# Patient Record
Sex: MALE | Race: BLACK OR AFRICAN AMERICAN | NOT HISPANIC OR LATINO | Employment: FULL TIME | URBAN - METROPOLITAN AREA
[De-identification: names, ages, dates, MRNs, and addresses within clinical notes are randomized per-mention and may not be internally consistent; named-entity substitution may affect disease eponyms.]

---

## 2022-07-01 ENCOUNTER — HOSPITAL ENCOUNTER (EMERGENCY)
Facility: HOSPITAL | Age: 27
Discharge: HOME | End: 2022-07-01
Attending: EMERGENCY MEDICINE
Payer: COMMERCIAL

## 2022-07-01 VITALS
HEART RATE: 60 BPM | HEIGHT: 66 IN | TEMPERATURE: 98 F | WEIGHT: 155 LBS | SYSTOLIC BLOOD PRESSURE: 112 MMHG | RESPIRATION RATE: 16 BRPM | DIASTOLIC BLOOD PRESSURE: 70 MMHG | BODY MASS INDEX: 24.91 KG/M2 | OXYGEN SATURATION: 100 %

## 2022-07-01 DIAGNOSIS — R10.13 EPIGASTRIC ABDOMINAL PAIN: Primary | ICD-10-CM

## 2022-07-01 LAB
ALBUMIN SERPL-MCNC: 4 GM/DL (ref 3.5–5)
ALBUMIN/GLOB SERPL: 1.3 RATIO (ref 1.1–2)
ALP SERPL-CCNC: 69 UNIT/L (ref 40–150)
ALT SERPL-CCNC: 18 UNIT/L (ref 0–55)
APPEARANCE UR: CLEAR
AST SERPL-CCNC: 21 UNIT/L (ref 5–34)
BACTERIA #/AREA URNS AUTO: NORMAL /HPF
BASOPHILS # BLD AUTO: 0.03 X10(3)/MCL (ref 0–0.2)
BASOPHILS NFR BLD AUTO: 0.7 %
BILIRUB UR QL STRIP.AUTO: NEGATIVE MG/DL
BILIRUBIN DIRECT+TOT PNL SERPL-MCNC: 1.2 MG/DL
BUN SERPL-MCNC: 13.8 MG/DL (ref 8.9–20.6)
CALCIUM SERPL-MCNC: 9.5 MG/DL (ref 8.4–10.2)
CHLORIDE SERPL-SCNC: 105 MMOL/L (ref 98–107)
CO2 SERPL-SCNC: 32 MMOL/L (ref 22–29)
COLOR UR AUTO: YELLOW
CREAT SERPL-MCNC: 0.81 MG/DL (ref 0.73–1.18)
EOSINOPHIL # BLD AUTO: 0.14 X10(3)/MCL (ref 0–0.9)
EOSINOPHIL NFR BLD AUTO: 3.3 %
ERYTHROCYTE [DISTWIDTH] IN BLOOD BY AUTOMATED COUNT: 11.9 % (ref 11.5–17)
GLOBULIN SER-MCNC: 3.2 GM/DL (ref 2.4–3.5)
GLUCOSE SERPL-MCNC: 73 MG/DL (ref 74–100)
GLUCOSE UR QL STRIP.AUTO: NEGATIVE MG/DL
HCT VFR BLD AUTO: 45 % (ref 42–52)
HGB BLD-MCNC: 15 GM/DL (ref 14–18)
IMM GRANULOCYTES # BLD AUTO: 0.01 X10(3)/MCL (ref 0–0.04)
IMM GRANULOCYTES NFR BLD AUTO: 0.2 %
KETONES UR QL STRIP.AUTO: NEGATIVE MG/DL
LEUKOCYTE ESTERASE UR QL STRIP.AUTO: NEGATIVE UNIT/L
LIPASE SERPL-CCNC: 38 U/L
LYMPHOCYTES # BLD AUTO: 1.03 X10(3)/MCL (ref 0.6–4.6)
LYMPHOCYTES NFR BLD AUTO: 24.5 %
MAGNESIUM SERPL-MCNC: 1.9 MG/DL (ref 1.6–2.6)
MCH RBC QN AUTO: 31.1 PG (ref 27–31)
MCHC RBC AUTO-ENTMCNC: 33.3 MG/DL (ref 33–36)
MCV RBC AUTO: 93.4 FL (ref 80–94)
MONOCYTES # BLD AUTO: 0.59 X10(3)/MCL (ref 0.1–1.3)
MONOCYTES NFR BLD AUTO: 14 %
NEUTROPHILS # BLD AUTO: 2.4 X10(3)/MCL (ref 2.1–9.2)
NEUTROPHILS NFR BLD AUTO: 57.3 %
NITRITE UR QL STRIP.AUTO: NEGATIVE
NRBC BLD AUTO-RTO: 0 %
PH UR STRIP.AUTO: 7 [PH]
PLATELET # BLD AUTO: 140 X10(3)/MCL (ref 130–400)
PMV BLD AUTO: 12.8 FL (ref 7.4–10.4)
POTASSIUM SERPL-SCNC: 4 MMOL/L (ref 3.5–5.1)
PROT SERPL-MCNC: 7.2 GM/DL (ref 6.4–8.3)
PROT UR QL STRIP.AUTO: NEGATIVE MG/DL
RBC # BLD AUTO: 4.82 X10(6)/MCL (ref 4.7–6.1)
RBC #/AREA URNS AUTO: <5 /HPF
RBC UR QL AUTO: NEGATIVE UNIT/L
SODIUM SERPL-SCNC: 141 MMOL/L (ref 136–145)
SP GR UR STRIP.AUTO: 1.02 (ref 1–1.03)
SQUAMOUS #/AREA URNS AUTO: <5 /HPF
UROBILINOGEN UR STRIP-ACNC: 1 MG/DL
WBC # SPEC AUTO: 4.2 X10(3)/MCL (ref 4.5–11.5)
WBC #/AREA URNS AUTO: <5 /HPF

## 2022-07-01 PROCEDURE — 99285 EMERGENCY DEPT VISIT HI MDM: CPT | Mod: 25

## 2022-07-01 PROCEDURE — 83735 ASSAY OF MAGNESIUM: CPT | Performed by: NURSE PRACTITIONER

## 2022-07-01 PROCEDURE — 25000003 PHARM REV CODE 250: Performed by: NURSE PRACTITIONER

## 2022-07-01 PROCEDURE — 81001 URINALYSIS AUTO W/SCOPE: CPT | Performed by: NURSE PRACTITIONER

## 2022-07-01 PROCEDURE — 36415 COLL VENOUS BLD VENIPUNCTURE: CPT | Performed by: NURSE PRACTITIONER

## 2022-07-01 PROCEDURE — 80053 COMPREHEN METABOLIC PANEL: CPT | Performed by: NURSE PRACTITIONER

## 2022-07-01 PROCEDURE — 85025 COMPLETE CBC W/AUTO DIFF WBC: CPT | Performed by: NURSE PRACTITIONER

## 2022-07-01 PROCEDURE — 83690 ASSAY OF LIPASE: CPT | Performed by: NURSE PRACTITIONER

## 2022-07-01 PROCEDURE — 96360 HYDRATION IV INFUSION INIT: CPT

## 2022-07-01 RX ORDER — PANTOPRAZOLE SODIUM 40 MG/1
40 TABLET, DELAYED RELEASE ORAL DAILY
Qty: 30 TABLET | Refills: 11 | Status: SHIPPED | OUTPATIENT
Start: 2022-07-01 | End: 2023-07-01

## 2022-07-01 RX ORDER — MAG HYDROX/ALUMINUM HYD/SIMETH 200-200-20
30 SUSPENSION, ORAL (FINAL DOSE FORM) ORAL ONCE
Status: COMPLETED | OUTPATIENT
Start: 2022-07-01 | End: 2022-07-01

## 2022-07-01 RX ORDER — SUCRALFATE 1 G/1
1 TABLET ORAL 4 TIMES DAILY
Qty: 28 TABLET | Refills: 0 | Status: SHIPPED | OUTPATIENT
Start: 2022-07-01 | End: 2022-07-08

## 2022-07-01 RX ORDER — DICYCLOMINE HYDROCHLORIDE 20 MG/1
20 TABLET ORAL 3 TIMES DAILY PRN
Qty: 15 TABLET | Refills: 0 | Status: SHIPPED | OUTPATIENT
Start: 2022-07-01 | End: 2022-07-06

## 2022-07-01 RX ORDER — LIDOCAINE HYDROCHLORIDE 20 MG/ML
10 SOLUTION OROPHARYNGEAL ONCE
Status: COMPLETED | OUTPATIENT
Start: 2022-07-01 | End: 2022-07-01

## 2022-07-01 RX ADMIN — LIDOCAINE HYDROCHLORIDE 10 ML: 20 SOLUTION ORAL at 04:07

## 2022-07-01 RX ADMIN — SODIUM CHLORIDE 1000 ML: 9 INJECTION, SOLUTION INTRAVENOUS at 01:07

## 2022-07-01 RX ADMIN — ALUMINUM HYDROXIDE, MAGNESIUM HYDROXIDE, AND SIMETHICONE 30 ML: 200; 200; 20 SUSPENSION ORAL at 04:07

## 2022-07-01 NOTE — FIRST PROVIDER EVALUATION
Medical screening exam completed.  I have conducted a focused provider triage encounter, findings are as follows:    Brief history of present illness:  27 y/o male who presents with epigastric area discomfort with intermittent nausea. He did vomit Tuesday only. States was drinking heavy (bottle wine per day and sometimes hard liquor) but stopped 5 days ago.     There were no vitals filed for this visit.    Pertinent physical exam:  Alert , nonlabored respirations, cooperative    Brief workup plan:  Labs, urine    Preliminary workup initiated; this workup will be continued and followed by the physician or advanced practice provider that is assigned to the patient when roomed.

## 2022-07-01 NOTE — ED TRIAGE NOTES
Pt c/o right sided abd pain since Tuesday w/ n/v. Hx Lupus. Pt reports etoh daily of 1 bottle of wine or liquor. Stopped drinking 5d ago. Denies dysuria. Pt has not had bowel movement in 5 days. SEE DARY

## 2022-07-04 NOTE — ED PROVIDER NOTES
Encounter Date: 7/1/2022       History     Chief Complaint   Patient presents with    Abdominal Pain     Pt c/o right sided abd pain since Tuesday w/ n/v. Hx Lupus. Pt reports etoh daily of 1 bottle of wine or liquor. Stopped drinking 5d ago. Denies dysuria.      26-year-old male presents with complaint of right upper abdominal pain that has been going on since Tuesday with some nausea and vomiting intermittently as well.  Denies any diarrhea denies any constipation states he is having normal bowel movements.  He denies any fever.  He states that he has a history of lupus.  He also states that he usually drinks about a bottle of wine per day sometimes hard liquor as well however he has cut all alcohol at about 5 days ago    The history is provided by the patient. No  was used.   Abdominal Pain  The current episode started several days ago. The problem has not changed since onset.The abdominal pain is located in the RUQ and epigastric region. The abdominal pain does not radiate. The severity of the abdominal pain is 5/10. The abdominal pain is relieved by drinking and eating. The other symptoms of the illness do not include vomiting or diarrhea.   The patient states that she believes she is currently not pregnant.     Review of patient's allergies indicates:  No Known Allergies  Past Medical History:   Diagnosis Date    Systemic lupus erythematosus, organ or system involvement unspecified      History reviewed. No pertinent surgical history.  No family history on file.     Review of Systems   Gastrointestinal: Positive for abdominal pain. Negative for diarrhea and vomiting.   All other systems reviewed and are negative.      Physical Exam     Initial Vitals [07/01/22 1257]   BP Pulse Resp Temp SpO2   108/64 (!) 59 18 98.2 °F (36.8 °C) 99 %      MAP       --         Physical Exam    Nursing note and vitals reviewed.  Constitutional: He appears well-developed and well-nourished.   Eyes:  Conjunctivae are normal.   Cardiovascular: Normal rate, regular rhythm and normal heart sounds.   Pulmonary/Chest: Breath sounds normal. No respiratory distress.   Abdominal: Abdomen is soft. Bowel sounds are normal. There is abdominal tenderness in the right upper quadrant and epigastric area.   Musculoskeletal:         General: Normal range of motion.     Neurological: He is alert and oriented to person, place, and time.   Skin: Skin is warm and dry.   Psychiatric: He has a normal mood and affect.         ED Course   Procedures  Labs Reviewed   CBC WITH DIFFERENTIAL - Abnormal; Notable for the following components:       Result Value    WBC 4.2 (*)     MCH 31.1 (*)     MPV 12.8 (*)     All other components within normal limits   COMPREHENSIVE METABOLIC PANEL - Abnormal; Notable for the following components:    Carbon Dioxide 32 (*)     Glucose Level 73 (*)     All other components within normal limits   LIPASE - Normal   URINALYSIS, REFLEX TO URINE CULTURE - Normal   MAGNESIUM - Normal   URINALYSIS, MICROSCOPIC - Normal          Imaging Results          US Abdomen Limited (Final result)  Result time 07/01/22 16:12:54    Final result by Marija Velasco MD (07/01/22 16:12:54)                 Impression:      No significant abnormality.      Electronically signed by: Marija Velasco  Date:    07/01/2022  Time:    16:12             Narrative:    EXAMINATION:  US ABDOMEN LIMITED    CLINICAL HISTORY:  upper abdominal pain;    TECHNIQUE:  Limited ultrasound of the right upper quadrant of the abdomen was performed.    COMPARISON:  None    FINDINGS:  LIVER: 15.5 cm cranial caudal at the midclavicular line. Normal echotexture. No focal mass appreciable. Portal vein is patent with appropriate directional flow.    PANCREAS: Head of the pancreas appears normal. Tail is obscured by bowel gas.    GALLBLADDER: No shadowing calculi, wall thickening, or pericholecystic fluid.    BILE DUCTS: 3 mm common bile duct.  Distal  common bile duct is obscured by shadowing bowel gas.    INFERIOR VENA CAVA: Normal in appearance.    RIGHT KIDNEY: 11.2 cm. No hydronephrosis.  There is an incidental, simple, anechoic 1.9 cm right renal cyst which requires no imaging follow-up.    OTHER: No ascites.  IVC has a normal appearance.  Aorta is normal in caliber.                                 Medications   sodium chloride 0.9% bolus 1,000 mL (0 mLs Intravenous Stopped 7/1/22 1400)   aluminum-magnesium hydroxide-simethicone 200-200-20 mg/5 mL suspension 30 mL (30 mLs Oral Given 7/1/22 1615)     And   LIDOcaine HCl 2% oral solution 10 mL (10 mLs Oral Given 7/1/22 1615)     Medical Decision Making:   Clinical Tests:   Lab Tests: Ordered and Reviewed  Radiological Study: Ordered and Reviewed  ED Management:  Lab work unremarkable for acute findings.  Ultrasound also no acute findings.  GI cocktail did help the patient's symptoms he states that he is feeling better.  Will treat as gastritis.    Additional MDM:   Differential Diagnosis:   Other: The following diagnoses were also considered and will be evaluated: Pancreatitis, Gastritis.                    Clinical Impression:   Final diagnoses:  [R10.13] Epigastric abdominal pain (Primary)          ED Disposition Condition    Discharge Stable        ED Prescriptions     Medication Sig Dispense Start Date End Date Auth. Provider    pantoprazole (PROTONIX) 40 MG tablet Take 1 tablet (40 mg total) by mouth once daily. 30 tablet 7/1/2022 7/1/2023 JAMAR Angelo    sucralfate (CARAFATE) 1 gram tablet Take 1 tablet (1 g total) by mouth 4 (four) times daily. for 7 days 28 tablet 7/1/2022 7/8/2022 JAMAR Angelo    dicyclomine (BENTYL) 20 mg tablet Take 1 tablet (20 mg total) by mouth 3 (three) times daily as needed (abdominal pain). 15 tablet 7/1/2022 7/6/2022 JAMAR Angelo        Follow-up Information     Follow up With Specialties Details Why Contact Info    Negro Torrez MD Internal  Medicine Call in 1 week As needed, If symptoms worsen [address] [facility]  [facility]  [place] [address]  [phone]      Saeid Silver MD Gastroenterology   [address] [place] [address] [place] [address]  [phone]             Amairani Horner, [facility]  07/04/22 1504

## 2023-03-17 ENCOUNTER — HOSPITAL ENCOUNTER (EMERGENCY)
Facility: HOSPITAL | Age: 28
Discharge: HOME | End: 2023-03-17
Attending: EMERGENCY MEDICINE
Payer: MEDICAID

## 2023-03-17 VITALS
HEIGHT: 66 IN | TEMPERATURE: 98 F | BODY MASS INDEX: 24.11 KG/M2 | WEIGHT: 150 LBS | SYSTOLIC BLOOD PRESSURE: 122 MMHG | HEART RATE: 50 BPM | DIASTOLIC BLOOD PRESSURE: 60 MMHG | RESPIRATION RATE: 18 BRPM | OXYGEN SATURATION: 100 %

## 2023-03-17 DIAGNOSIS — R10.9 ABDOMINAL CRAMPING: Primary | ICD-10-CM

## 2023-03-17 DIAGNOSIS — N50.819 PAIN IN TESTICLE, UNSPECIFIED LATERALITY: ICD-10-CM

## 2023-03-17 DIAGNOSIS — N50.819 TESTICULAR PAIN: ICD-10-CM

## 2023-03-17 LAB
ABS NEUT (OLG): 2.46 X10(3)/MCL (ref 2.1–9.2)
ALBUMIN SERPL-MCNC: 4.4 G/DL (ref 3.5–5)
ALBUMIN/GLOB SERPL: 1.3 RATIO (ref 1.1–2)
ALP SERPL-CCNC: 64 UNIT/L (ref 40–150)
ALT SERPL-CCNC: 21 UNIT/L (ref 0–55)
APPEARANCE UR: CLEAR
AST SERPL-CCNC: 27 UNIT/L (ref 5–34)
BACTERIA #/AREA URNS AUTO: NORMAL /HPF
BILIRUB UR QL STRIP.AUTO: NEGATIVE MG/DL
BILIRUBIN DIRECT+TOT PNL SERPL-MCNC: 2 MG/DL
BUN SERPL-MCNC: 15.1 MG/DL (ref 8.9–20.6)
CALCIUM SERPL-MCNC: 9.7 MG/DL (ref 8.4–10.2)
CHLORIDE SERPL-SCNC: 108 MMOL/L (ref 98–107)
CO2 SERPL-SCNC: 24 MMOL/L (ref 22–29)
COLOR UR AUTO: YELLOW
CREAT SERPL-MCNC: 0.87 MG/DL (ref 0.73–1.18)
EOSINOPHIL NFR BLD MANUAL: 0.2 X10(3)/MCL (ref 0–0.9)
EOSINOPHIL NFR BLD MANUAL: 5 %
ERYTHROCYTE [DISTWIDTH] IN BLOOD BY AUTOMATED COUNT: 11.7 % (ref 11.5–17)
GFR SERPLBLD CREATININE-BSD FMLA CKD-EPI: >60 MLS/MIN/1.73/M2
GLOBULIN SER-MCNC: 3.3 GM/DL (ref 2.4–3.5)
GLUCOSE SERPL-MCNC: 89 MG/DL (ref 74–100)
GLUCOSE UR QL STRIP.AUTO: NEGATIVE MG/DL
HCT VFR BLD AUTO: 46.5 % (ref 42–52)
HGB BLD-MCNC: 15.7 G/DL (ref 14–18)
INSTRUMENT WBC (OLG): 3.9 X10(3)/MCL
KETONES UR QL STRIP.AUTO: ABNORMAL MG/DL
LEUKOCYTE ESTERASE UR QL STRIP.AUTO: NEGATIVE UNIT/L
LYMPHOCYTES NFR BLD MANUAL: 0.86 X10(3)/MCL
LYMPHOCYTES NFR BLD MANUAL: 22 %
MCH RBC QN AUTO: 31 PG
MCHC RBC AUTO-ENTMCNC: 33.8 G/DL (ref 33–36)
MCV RBC AUTO: 91.9 FL (ref 80–94)
MONOCYTES NFR BLD MANUAL: 0.43 X10(3)/MCL (ref 0.1–1.3)
MONOCYTES NFR BLD MANUAL: 11 %
NEUTROPHILS NFR BLD MANUAL: 63 %
NITRITE UR QL STRIP.AUTO: NEGATIVE
NRBC BLD AUTO-RTO: 0 %
PH UR STRIP.AUTO: 6.5 [PH]
PLATELET # BLD AUTO: 142 X10(3)/MCL (ref 130–400)
PLATELET # BLD EST: NORMAL 10*3/UL
PMV BLD AUTO: 12.2 FL (ref 7.4–10.4)
POTASSIUM SERPL-SCNC: 3.8 MMOL/L (ref 3.5–5.1)
PROT SERPL-MCNC: 7.7 GM/DL (ref 6.4–8.3)
PROT UR QL STRIP.AUTO: NEGATIVE MG/DL
RBC # BLD AUTO: 5.06 X10(6)/MCL (ref 4.7–6.1)
RBC #/AREA URNS AUTO: <5 /HPF
RBC MORPH BLD: NORMAL
RBC UR QL AUTO: NEGATIVE UNIT/L
SODIUM SERPL-SCNC: 140 MMOL/L (ref 136–145)
SP GR UR STRIP.AUTO: 1.03 (ref 1–1.03)
SQUAMOUS #/AREA URNS AUTO: <5 /HPF
UROBILINOGEN UR STRIP-ACNC: 1 MG/DL
WBC # SPEC AUTO: 3.9 X10(3)/MCL (ref 4.5–11.5)
WBC #/AREA URNS AUTO: <5 /HPF

## 2023-03-17 PROCEDURE — 81001 URINALYSIS AUTO W/SCOPE: CPT | Performed by: EMERGENCY MEDICINE

## 2023-03-17 PROCEDURE — 85007 BL SMEAR W/DIFF WBC COUNT: CPT | Performed by: EMERGENCY MEDICINE

## 2023-03-17 PROCEDURE — 63600175 PHARM REV CODE 636 W HCPCS: Performed by: EMERGENCY MEDICINE

## 2023-03-17 PROCEDURE — 25500020 PHARM REV CODE 255: Performed by: EMERGENCY MEDICINE

## 2023-03-17 PROCEDURE — 99285 EMERGENCY DEPT VISIT HI MDM: CPT | Mod: 25

## 2023-03-17 PROCEDURE — 96374 THER/PROPH/DIAG INJ IV PUSH: CPT | Mod: 59

## 2023-03-17 PROCEDURE — 96361 HYDRATE IV INFUSION ADD-ON: CPT

## 2023-03-17 PROCEDURE — 80053 COMPREHEN METABOLIC PANEL: CPT | Performed by: EMERGENCY MEDICINE

## 2023-03-17 PROCEDURE — 25000003 PHARM REV CODE 250: Performed by: EMERGENCY MEDICINE

## 2023-03-17 PROCEDURE — 85025 COMPLETE CBC W/AUTO DIFF WBC: CPT | Performed by: EMERGENCY MEDICINE

## 2023-03-17 RX ORDER — INDOMETHACIN 50 MG/1
50 CAPSULE ORAL
Qty: 15 CAPSULE | Refills: 0 | Status: SHIPPED | OUTPATIENT
Start: 2023-03-17

## 2023-03-17 RX ORDER — KETOROLAC TROMETHAMINE 30 MG/ML
30 INJECTION, SOLUTION INTRAMUSCULAR; INTRAVENOUS
Status: COMPLETED | OUTPATIENT
Start: 2023-03-17 | End: 2023-03-17

## 2023-03-17 RX ADMIN — IOPAMIDOL 100 ML: 755 INJECTION, SOLUTION INTRAVENOUS at 10:03

## 2023-03-17 RX ADMIN — KETOROLAC TROMETHAMINE 30 MG: 30 INJECTION, SOLUTION INTRAMUSCULAR; INTRAVENOUS at 08:03

## 2023-03-17 RX ADMIN — SODIUM CHLORIDE 1000 ML: 9 INJECTION, SOLUTION INTRAVENOUS at 08:03

## 2023-03-17 NOTE — ED PROVIDER NOTES
"Encounter Date: 3/17/2023    SCRIBE #1 NOTE: I, Darnell Brunner, am scribing for, and in the presence of,  Dr. Queen. I have scribed the following portions of the note - Other sections scribed: HPI, ROS, Physical Exam, MDM, Attending.     History     Chief Complaint   Patient presents with    Testicle Pain     C/o left testicle pain radiating down left thigh that started yesterday. States it hurts worse while using the bathroom. Denies swelling, trauma & heavy lifting.      26 y/o AAM with history of SLE presents to ED for L testicle pain onset yesterday around 1700.  Pt says it feels like his "veins are swollen" in his testicle.  He also complains of foot numbness, bilateral leg weakness, dysuria, lower abdominal pain and sharp L thigh pain.  He has never had similar symptoms before.  Pt has no history of kidney stone, testicular torsion or infection.  He denies any penile discharge, back pain, HA, neck pain, fever, chills, cough, rhinorrhea, congestion, chest pain or visual changes.    The history is provided by the patient.   Testicle Pain  This is a new problem. The current episode started yesterday. The problem occurs constantly. The problem has not changed since onset.Associated symptoms include abdominal pain. Pertinent negatives include no chest pain.   Review of patient's allergies indicates:  No Known Allergies  Past Medical History:   Diagnosis Date    Systemic lupus erythematosus, organ or system involvement unspecified      History reviewed. No pertinent surgical history.  History reviewed. No pertinent family history.     Review of Systems   Constitutional:  Negative for chills and fever.   HENT:  Negative for congestion and rhinorrhea.    Eyes:  Negative for visual disturbance.   Respiratory:  Negative for cough.    Cardiovascular:  Negative for chest pain.   Gastrointestinal:  Positive for abdominal pain.   Genitourinary:  Positive for dysuria and testicular pain.   Musculoskeletal:  Negative for " back pain and neck pain.   Neurological:  Positive for weakness (legs) and numbness (feet).     Physical Exam     Initial Vitals   BP Pulse Resp Temp SpO2   03/17/23 0805 03/17/23 0805 03/17/23 0805 03/17/23 0808 03/17/23 0805   (!) 117/55 60 18 97.5 °F (36.4 °C) 98 %      MAP       --                Physical Exam    Nursing note and vitals reviewed.  Constitutional:   Appears to be in mild to moderate discomfort    HENT:   Head: Normocephalic and atraumatic.   Neck: Trachea normal.   Cardiovascular:  Normal rate and regular rhythm.           No murmur heard.  Pulmonary/Chest: Breath sounds normal. No respiratory distress.   Abdominal: Abdomen is soft. Bowel sounds are normal. He exhibits no distension. There is abdominal tenderness (moderate lower).   Genitourinary:    Genitourinary Comments: No mass; L testicular tenderness; no testicular edema or swelling; normal riding testicles; no rash     Musculoskeletal:         General: Normal range of motion.      Lumbar back: Normal.     Neurological: He is alert and oriented to person, place, and time. He has normal strength. No cranial nerve deficit.   Skin: Skin is warm and dry. No rash noted.   Psychiatric: He has a normal mood and affect. Judgment normal.       ED Course   Procedures  Labs Reviewed   URINALYSIS, REFLEX TO URINE CULTURE - Abnormal; Notable for the following components:       Result Value    Ketones, UA Trace (*)     All other components within normal limits   COMPREHENSIVE METABOLIC PANEL - Abnormal; Notable for the following components:    Chloride 108 (*)     Bilirubin Total 2.0 (*)     All other components within normal limits   CBC WITH DIFFERENTIAL - Abnormal; Notable for the following components:    WBC 3.9 (*)     MPV 12.2 (*)     All other components within normal limits   URINALYSIS, MICROSCOPIC - Normal   CBC W/ AUTO DIFFERENTIAL    Narrative:     The following orders were created for panel order CBC auto differential.  Procedure                                Abnormality         Status                     ---------                               -----------         ------                     CBC with Differential[[id number]]        Abnormal            Final result               Manual Differential[[id number]]                              Final result                 Please view results for these tests on the individual orders.   MANUAL DIFFERENTIAL          Imaging Results              CT Abdomen Pelvis With Contrast (Final result)  Result time 03/17/23 10:53:23      Final result by Deyanira Curtis MD (03/17/23 10:53:23)                   Impression:      No abnormality seen      Electronically signed by: Deyanira Curtis  Date:    03/17/2023  Time:    10:53               Narrative:    EXAMINATION:  CT ABDOMEN PELVIS WITH CONTRAST    CLINICAL HISTORY:  Abdominal pain, acute, nonlocalized;    TECHNIQUE:  Low dose axial images, sagittal and coronal reformations were obtained from the lung bases to the pubic symphysis following the IV administration of contrast. Automatic exposure control (AEC) is utilized to reduce patient radiation exposure.    COMPARISON:  None.    FINDINGS:  The lung bases are clear.    The liver appears normal.  No liver mass or lesion is seen.  Portal and hepatic veins appear normal.    The gallbladder appears normal.  No obvious gallstones are seen.  No biliary dilatation is seen.  No pericholecystic fluid is seen.    The pancreas appears normal.  No pancreatic mass or lesion is seen.    The spleen shows no acute abnormality.    The adrenal glands appear normal.  No adrenal nodule is seen.    The kidneys appear normal.  There is a simple cyst in the midpole the right kidney.  No hydronephrosis is seen.  No hydroureter is seen.  No nephrolithiasis is seen.  No obvious ureteral stones are seen.    Urinary bladder appears grossly unremarkable.  No inguinal abnormality is seen.  Visualized portion of the testicle show no acute  abnormality.    No colitis is seen.  No diverticulitis is seen.  No obvious colonic mass or lesion is seen.    No free air is seen.  No free fluid is seen.                                       US Scrotum And Testicles (Final result)  Result time 03/17/23 09:58:51      Final result by Maximiliano Walker MD (03/17/23 09:58:51)                   Narrative:    EXAMINATION  US SCROTUM AND TESTICLES    CLINICAL HISTORY  Left  testicular pain, unspecified    TECHNIQUE  Grayscale and Doppler interrogation of the scrotum and testes.    COMPARISON  19 July 2018    FINDINGS  Exam quality: adequate for evaluation    Right Testicle: Surface contour intact and smooth. No evidence of focal mass or infiltrative parenchymal abnormality.  Focal echogenic structure within the testicular lower pole measures up to 2 mm in diameter, unchanged in the interval.  No significant enlargement or heterogeneity of the epididymis.    Left Testicle: Surface contour intact and smooth. No evidence of focal mass or infiltrative parenchymal abnormality.  No significant enlargement or heterogeneity of the epididymis.  Simple appearing 3 mm cystic structure is present at the left epididymal head.    Doppler Interrogation: Spontaneous arterial flow is demonstrated within each testicle, with symmetric velocity and normal low-resistance spectral waveform.  Bilateral venous outflow is maintained.    Other findings: No significant hydrocele or other abnormal fluid appreciated.  No varicocele appreciated bilaterally.      Measurements:    *Right testicle: 4.7 cm x 2.3 cm x 3 cm (17 cc)  *Left testicle: 4.1 cm x 2.1 cm x 2.8 cm (12 cc)    IMPRESSION  1. No evidence of active testicular torsion or other acute process.  2. Similar appearance of echogenic right lower testicular pole structure, with no expansile or infiltrative mass-like abnormality.  3. Small, simple left epididymal head cyst.      Electronically signed by: Maximiliano  Denise  Date:    03/17/2023  Time:    09:58                                     Medications   ketorolac injection 30 mg (30 mg Intravenous Given 3/17/23 0845)   sodium chloride 0.9% bolus 1,000 mL 1,000 mL (1,000 mLs Intravenous New Bag 3/17/23 0845)   iopamidoL (ISOVUE-370) injection 100 mL (100 mLs Intravenous Given 3/17/23 1040)              Scribe Attestation:   Scribe #1: I performed the above scribed service and the documentation accurately describes the services I performed. I attest to the accuracy of the note.    Attending Attestation:           Physician Attestation for Scribe:  Physician Attestation Statement for Scribe #1: I, reviewed documentation, as scribed by Darnell Brunner in my presence, and it is both accurate and complete.         Medical Decision Making  Differential diagnoses include, but are not limited to: testicular torsion, epididymitis, diverticulitis, appendicitis, kidney stone      CBC chemistry UA without abnormality ultrasound of the testicle shows normal testes normal flow no epididymitis no orchitis.  CT done secondary to abdominal pain with normal exam patient will be discharged follow up primary care patient has had other episodes of this in the past    Problems Addressed:  Abdominal cramping: acute illness or injury  Pain in testicle, unspecified laterality: complicated acute illness or injury    Amount and/or Complexity of Data Reviewed  Labs: ordered. Decision-making details documented in ED Course.  Radiology: ordered. Decision-making details documented in ED Course.            ED Course as of 03/17/23 1149   Fri Mar 17, 2023   0853 Left testicle pain abdominal pain seems most likely intra-abdominal pathology but will ultrasound testicles 1st then depending on urinalysis will CT with or without contrast will give fluids and Toradol for pain. [FK]   1027 Ultrasound without torsion or epididymitis effectively rules out testicular abnormalities combined with physical exam [FK]       ED Course User Index  [FK] Noel Queen III, MD                 Clinical Impression:   Final diagnoses:  [N50.819] Testicular pain  [R10.9] Abdominal cramping (Primary)  [N50.819] Pain in testicle, unspecified laterality        ED Disposition Condition    Discharge Stable          ED Prescriptions       Medication Sig Dispense Start Date End Date Auth. Provider    indomethacin (INDOCIN) 50 MG capsule Take 1 capsule (50 mg total) by mouth 3 (three) times daily with meals. 15 capsule 3/17/2023 -- Noel Queen III, MD          Follow-up Information       Follow up With Specialties Details Why Contact Info    Negro Torrez MD Internal Medicine In 1 week  [address] [facility]  [facility]  [place] [id number]  [phone]               Noel Queen III, MD  03/17/23 5658

## 2024-01-20 ENCOUNTER — HOSPITAL ENCOUNTER (EMERGENCY)
Facility: HOSPITAL | Age: 29
Discharge: HOME | End: 2024-01-20
Attending: STUDENT IN AN ORGANIZED HEALTH CARE EDUCATION/TRAINING PROGRAM

## 2024-01-20 VITALS
WEIGHT: 160 LBS | SYSTOLIC BLOOD PRESSURE: 117 MMHG | RESPIRATION RATE: 16 BRPM | TEMPERATURE: 98 F | OXYGEN SATURATION: 98 % | DIASTOLIC BLOOD PRESSURE: 66 MMHG | HEART RATE: 89 BPM | BODY MASS INDEX: 25.82 KG/M2

## 2024-01-20 DIAGNOSIS — R07.89 CHEST WALL PAIN: Primary | ICD-10-CM

## 2024-01-20 LAB
ALBUMIN SERPL-MCNC: 3.8 G/DL (ref 3.5–5)
ALBUMIN/GLOB SERPL: 1.2 RATIO (ref 1.1–2)
ALP SERPL-CCNC: 71 UNIT/L (ref 40–150)
ALT SERPL-CCNC: 17 UNIT/L (ref 0–55)
AST SERPL-CCNC: 22 UNIT/L (ref 5–34)
BASOPHILS # BLD AUTO: 0.03 X10(3)/MCL
BASOPHILS NFR BLD AUTO: 0.9 %
BILIRUB SERPL-MCNC: 0.9 MG/DL
BNP BLD-MCNC: <10 PG/ML
BUN SERPL-MCNC: 15.1 MG/DL (ref 8.9–20.6)
CALCIUM SERPL-MCNC: 8.8 MG/DL (ref 8.4–10.2)
CHLORIDE SERPL-SCNC: 107 MMOL/L (ref 98–107)
CO2 SERPL-SCNC: 25 MMOL/L (ref 22–29)
CREAT SERPL-MCNC: 1 MG/DL (ref 0.73–1.18)
EOSINOPHIL # BLD AUTO: 0.3 X10(3)/MCL (ref 0–0.9)
EOSINOPHIL NFR BLD AUTO: 9.5 %
ERYTHROCYTE [DISTWIDTH] IN BLOOD BY AUTOMATED COUNT: 11.6 % (ref 11.5–17)
FLUAV AG UPPER RESP QL IA.RAPID: NOT DETECTED
FLUBV AG UPPER RESP QL IA.RAPID: NOT DETECTED
GFR SERPLBLD CREATININE-BSD FMLA CKD-EPI: >60 MLS/MIN/1.73/M2
GLOBULIN SER-MCNC: 3.3 GM/DL (ref 2.4–3.5)
GLUCOSE SERPL-MCNC: 93 MG/DL (ref 74–100)
HCT VFR BLD AUTO: 44.6 % (ref 42–52)
HGB BLD-MCNC: 15.2 G/DL (ref 14–18)
IMM GRANULOCYTES # BLD AUTO: 0 X10(3)/MCL (ref 0–0.04)
IMM GRANULOCYTES NFR BLD AUTO: 0 %
LYMPHOCYTES # BLD AUTO: 1.19 X10(3)/MCL (ref 0.6–4.6)
LYMPHOCYTES NFR BLD AUTO: 37.7 %
MCH RBC QN AUTO: 31 PG (ref 27–31)
MCHC RBC AUTO-ENTMCNC: 34.1 G/DL (ref 33–36)
MCV RBC AUTO: 91 FL (ref 80–94)
MONOCYTES # BLD AUTO: 0.61 X10(3)/MCL (ref 0.1–1.3)
MONOCYTES NFR BLD AUTO: 19.3 %
NEUTROPHILS # BLD AUTO: 1.03 X10(3)/MCL (ref 2.1–9.2)
NEUTROPHILS NFR BLD AUTO: 32.6 %
NRBC BLD AUTO-RTO: 0 %
PLATELET # BLD AUTO: 159 X10(3)/MCL (ref 130–400)
PMV BLD AUTO: 11.9 FL (ref 7.4–10.4)
POTASSIUM SERPL-SCNC: 4 MMOL/L (ref 3.5–5.1)
PROT SERPL-MCNC: 7.1 GM/DL (ref 6.4–8.3)
RBC # BLD AUTO: 4.9 X10(6)/MCL (ref 4.7–6.1)
SARS-COV-2 RNA RESP QL NAA+PROBE: NOT DETECTED
SODIUM SERPL-SCNC: 140 MMOL/L (ref 136–145)
TROPONIN I SERPL-MCNC: <0.01 NG/ML (ref 0–0.04)
WBC # SPEC AUTO: 3.16 X10(3)/MCL (ref 4.5–11.5)

## 2024-01-20 PROCEDURE — 85025 COMPLETE CBC W/AUTO DIFF WBC: CPT | Performed by: EMERGENCY MEDICINE

## 2024-01-20 PROCEDURE — 80053 COMPREHEN METABOLIC PANEL: CPT | Performed by: EMERGENCY MEDICINE

## 2024-01-20 PROCEDURE — 25000003 PHARM REV CODE 250: Performed by: STUDENT IN AN ORGANIZED HEALTH CARE EDUCATION/TRAINING PROGRAM

## 2024-01-20 PROCEDURE — 93010 ELECTROCARDIOGRAM REPORT: CPT | Mod: ,,, | Performed by: INTERNAL MEDICINE

## 2024-01-20 PROCEDURE — 96372 THER/PROPH/DIAG INJ SC/IM: CPT | Performed by: STUDENT IN AN ORGANIZED HEALTH CARE EDUCATION/TRAINING PROGRAM

## 2024-01-20 PROCEDURE — 84484 ASSAY OF TROPONIN QUANT: CPT | Performed by: STUDENT IN AN ORGANIZED HEALTH CARE EDUCATION/TRAINING PROGRAM

## 2024-01-20 PROCEDURE — 83880 ASSAY OF NATRIURETIC PEPTIDE: CPT | Performed by: STUDENT IN AN ORGANIZED HEALTH CARE EDUCATION/TRAINING PROGRAM

## 2024-01-20 PROCEDURE — 0240U COVID/FLU A&B PCR: CPT | Performed by: EMERGENCY MEDICINE

## 2024-01-20 PROCEDURE — 63600175 PHARM REV CODE 636 W HCPCS: Performed by: STUDENT IN AN ORGANIZED HEALTH CARE EDUCATION/TRAINING PROGRAM

## 2024-01-20 PROCEDURE — 93005 ELECTROCARDIOGRAM TRACING: CPT

## 2024-01-20 PROCEDURE — 99285 EMERGENCY DEPT VISIT HI MDM: CPT | Mod: 25

## 2024-01-20 RX ORDER — KETOROLAC TROMETHAMINE 30 MG/ML
30 INJECTION, SOLUTION INTRAMUSCULAR; INTRAVENOUS
Status: COMPLETED | OUTPATIENT
Start: 2024-01-20 | End: 2024-01-20

## 2024-01-20 RX ORDER — FAMOTIDINE 20 MG/1
20 TABLET, FILM COATED ORAL 2 TIMES DAILY
Qty: 20 TABLET | Refills: 0 | Status: ON HOLD | OUTPATIENT
Start: 2024-01-20 | End: 2024-05-03

## 2024-01-20 RX ORDER — KETOROLAC TROMETHAMINE 30 MG/ML
15 INJECTION, SOLUTION INTRAMUSCULAR; INTRAVENOUS
Status: DISCONTINUED | OUTPATIENT
Start: 2024-01-20 | End: 2024-01-20

## 2024-01-20 RX ORDER — METHOCARBAMOL 500 MG/1
1000 TABLET, FILM COATED ORAL
Status: COMPLETED | OUTPATIENT
Start: 2024-01-20 | End: 2024-01-20

## 2024-01-20 RX ORDER — LIDOCAINE 50 MG/G
1 PATCH TOPICAL DAILY
Qty: 5 PATCH | Refills: 0 | Status: ON HOLD | OUTPATIENT
Start: 2024-01-20 | End: 2024-05-03

## 2024-01-20 RX ORDER — NAPROXEN 500 MG/1
500 TABLET ORAL 2 TIMES DAILY WITH MEALS
Qty: 60 TABLET | Refills: 0 | Status: SHIPPED | OUTPATIENT
Start: 2024-01-20 | End: 2024-01-20

## 2024-01-20 RX ORDER — ONDANSETRON 4 MG/1
4 TABLET, ORALLY DISINTEGRATING ORAL EVERY 6 HOURS PRN
Qty: 12 TABLET | Refills: 0 | Status: ON HOLD | OUTPATIENT
Start: 2024-01-20 | End: 2024-05-03

## 2024-01-20 RX ORDER — METHOCARBAMOL 500 MG/1
1000 TABLET, FILM COATED ORAL 3 TIMES DAILY
Qty: 30 TABLET | Refills: 0 | Status: SHIPPED | OUTPATIENT
Start: 2024-01-20 | End: 2024-01-25

## 2024-01-20 RX ADMIN — METHOCARBAMOL 1000 MG: 500 TABLET ORAL at 11:01

## 2024-01-20 RX ADMIN — KETOROLAC TROMETHAMINE 30 MG: 30 INJECTION, SOLUTION INTRAMUSCULAR; INTRAVENOUS at 09:01

## 2024-01-20 NOTE — ED PROVIDER NOTES
"Encounter Date: 1/20/2024    SCRIBE #1 NOTE: I, Jeison Joe, am scribing for, and in the presence of,  Jose Caruso MD. I have scribed the following portions of the note - Other sections scribed: HPI, ROS, PE, MDM.       History     Chief Complaint   Patient presents with    Back Pain     Left upper back pain, worse when "trying to stretch" x 1 month, reports pain radiates to left anterior chest with SOB since yesterday.  Denies cardiac hx.     Chest Pain    Shortness of Breath     A 27 y/o male presents to [facility] ED with left sided chest pain worsening with deep inspiration and certain movements since doing his morning stretches this morning.  He denies shortness of breath.  He denies any fevers or chills.  He denies any cough.  He denies any falls or trauma.    The history is provided by the patient and medical records.     Review of patient's allergies indicates:  No Known Allergies  Past Medical History:   Diagnosis Date    Systemic lupus erythematosus, organ or system involvement unspecified      History reviewed. No pertinent surgical history.  History reviewed. No pertinent family history.     Review of Systems   Constitutional:  Negative for chills and fever.   HENT:  Negative for drooling and sore throat.    Eyes:  Negative for pain and redness.   Respiratory:  Negative for shortness of breath, wheezing and stridor.    Cardiovascular:  Positive for chest pain. Negative for palpitations and leg swelling.   Gastrointestinal:  Negative for abdominal pain, nausea and vomiting.   Genitourinary:  Negative for dysuria and hematuria.   Musculoskeletal:  Negative for back pain, neck pain and neck stiffness.   Skin:  Negative for rash and wound.   Neurological:  Negative for weakness and numbness.   Hematological:  Does not bruise/bleed easily.       Physical Exam     Initial Vitals [01/20/24 0723]   BP Pulse Resp Temp SpO2   117/66 89 16 98.1 °F (36.7 °C) 98 %      MAP       --         Physical " Exam    Nursing note and vitals reviewed.  Constitutional: He appears well-developed. He is not diaphoretic. No distress.   HENT:   Head: Normocephalic and atraumatic.   Nose: Nose normal.   Mouth/Throat: Oropharynx is clear and moist.   Eyes: Conjunctivae and EOM are normal. Pupils are equal, round, and reactive to light.   Cardiovascular:  Normal rate and regular rhythm.           No murmur heard.  Pulmonary/Chest: Breath sounds normal. No respiratory distress. He has no wheezes. He has no rales. He exhibits tenderness (left lateral chest wall).   Abdominal: Abdomen is soft. He exhibits no distension. There is no abdominal tenderness.     Neurological: He is alert and oriented to person, place, and time. He has normal strength. No cranial nerve deficit.   Skin: Skin is warm. Capillary refill takes less than 2 seconds. No rash noted.         ED Course   Procedures  Labs Reviewed   CBC WITH DIFFERENTIAL - Abnormal; Notable for the following components:       Result Value    WBC 3.16 (*)     MPV 11.9 (*)     Neut # 1.03 (*)     All other components within normal limits   COVID/FLU A&B PCR - Normal    Narrative:     The Xpert Xpress SARS-CoV-2/FLU/RSV plus is a rapid, multiplexed real-time PCR test intended for the simultaneous qualitative detection and differentiation of SARS-CoV-2, Influenza A, Influenza B, and respiratory syncytial virus (RSV) viral RNA in either nasopharyngeal swab or nasal swab specimens.         TROPONIN I - Normal   B-TYPE NATRIURETIC PEPTIDE - Normal   CBC W/ AUTO DIFFERENTIAL    Narrative:     The following orders were created for panel order CBC auto differential.  Procedure                               Abnormality         Status                     ---------                               -----------         ------                     CBC with Differential[[id number]]        Abnormal            Final result                 Please view results for these tests on the individual orders.    COMPREHENSIVE METABOLIC PANEL        ECG Results              EKG 12-lead (Final result)  Result time 01/20/24 09:23:20      Final result by Interface, Lab In [facility] (01/20/24 09:23:20)                   Narrative:    Test Reason : R07.9,    Vent. Rate : 053 BPM     Atrial Rate : 053 BPM     P-R Int : 132 ms          QRS Dur : 092 ms      QT Int : 410 ms       P-R-T Axes : 059 058 041 degrees     QTc Int : 384 ms    Sinus bradycardia  Otherwise normal ECG  No previous ECGs available  Confirmed by Eduardo Esparza MD ([id number]) on 1/20/2024 9:23:10 AM    Referred By:             Confirmed By:Eduardo Esparza MD                                     EKG 12-LEAD (Final result)  Result time 01/26/24 12:25:02      Final result by Unknown User (01/26/24 12:25:02)                                      Imaging Results              X-Ray Chest AP Portable (Final result)  Result time 01/20/24 08:00:10      Final result by Tommy Moura MD (01/20/24 08:00:10)                   Impression:      No acute cardiopulmonary abnormality.      Electronically signed by: Tommy Moura MD  Date:    01/20/2024  Time:    08:00               Narrative:    EXAMINATION:  Single view chest radiograph.    CLINICAL HISTORY:  chest pain;    TECHNIQUE:  Single view of the chest.    COMPARISON:  Chest radiograph 08/17/2020.    FINDINGS:  The lungs are clear without consolidation or effusion.  There is no pneumothorax.  The cardiac silhouette is normal in size.  There is no acute osseous abnormality.                                       Medications   ketorolac injection 30 mg (30 mg Intramuscular Given 1/20/24 0911)   methocarbamoL tablet 1,000 mg (1,000 mg Oral Given 1/20/24 1127)     Medical Decision Making  Problems Addressed:  Chest wall pain: acute illness or injury    Amount and/or Complexity of Data Reviewed  Labs: ordered.  Radiology: ordered. Decision-making details documented in ED Course.  ECG/medicine tests: ordered and independent interpretation  performed.    Risk  Prescription drug management.            Scribe Attestation:   Scribe #1: I performed the above scribed service and the documentation accurately describes the services I performed. I attest to the accuracy of the note.    Attending Attestation:           Physician Attestation for Scribe:  Physician Attestation Statement for Scribe #1: I, Jose Caruso MD, reviewed documentation, as scribed by Jeison Diaz in my presence, and it is both accurate and complete.             ED Course as of 02/14/24 0312   Sat Jan 20, 2024   0750 X-Ray Chest AP Portable  Independently visualized/reviewed by me during the ED visit.  - No acute lobar consolidation, no PTX []   0750 EKG independently interpreted by me.  EKG: SB @ 53, no STEMI, Qtc 384 []   1117 I have reassessed the patient.  Patient is resting comfortably, no acute distress.  Vital signs stable.  Discussed all results including incidental findings.  Discussed need for follow up and discussed return precautions.  Answered all questions at this time.  Hemodynamically stable for continued outpatient management. Patient verbalized understanding and agreed to plan.    [MC]      ED Course User Index  [MC] Jose Caruso MD          Differential diagnosis (includes but is not limited to):   ACS, arrhythmia, electrolyte abnormalities, pneumothorax, musculoskeletal chest wall pain, dehydration, kidney injury, electrolyte abnormalities    MDM Narrative  28-year-old male presents for chest wall pain to the left side that is reproducible.  COVID/flu swab pending.  EKG reviewed.  Labs are pending.  Patient is PERC negative.  Chest x-ray pending.  Medications for symptom control ordered.  Patient is currently hemodynamically stable and stable on room air with no evidence of respiratory distress.    Update:  Labs reviewed.  X-ray reviewed.  Patient reports near-complete resolution of his symptoms with medications given in the emergency  department.  Patient states he feels well and is ready for discharge home.  Patient is ambulatory at his baseline with a steady gait with no dyspnea or tachycardia or tachypnea.  He was tolerating oral intake.  Strict return precautions discussed and the patient has verbalized understanding.  Patient is to follow up with the primary care physician for further evaluation of his symptoms.  Prescriptions provided.    Dispo: Discharge    My independent radiology interpretation: as above  Point of care US (independently performed and interpreted):   Decision rules/clinical scoring:     Sepsis Perfusion Assessment:     Amount and/or Complexity of Data Reviewed  Independent historian: none   Summary of history:   External data reviewed: notes from previous admissions, notes from previous ED visits, and notes from clinic visits  Summary of data reviewed: Prior records reviewed  Risk and benefits of testing: discussed   Labs: ordered and reviewed  Radiology: ordered and independent interpretation performed (see above or ED course)  ECG/medicine tests: ordered and independent interpretation performed (see above or ED course)  Discussion of management or test interpretation with external provider(s): none   Summary of discussion:     Risk  OTC medications  Prescription drug management   Shared decision making     Critical Care  none    Data Reviewed/Counseling: I have personally reviewed the patient's vital signs, nursing notes, and other relevant tests, information, and imaging. I had a detailed discussion regarding the historical points, exam findings, and any diagnostic results supporting the discharge diagnosis. I personally performed the history, PE, MDM and procedures as documented above and agree with the scribe's documentation.    Portions of this note were dictated using voice recognition software. Although it was reviewed for accuracy, some inherent voice recognition errors may have occurred and may be present in  this document.                   Clinical Impression:  Final diagnoses:  [R07.89] Chest wall pain (Primary)          ED Disposition Condition    Discharge Stable          ED Prescriptions       Medication Sig Dispense Start Date End Date Auth. Provider    naproxen (NAPROSYN) 500 MG tablet  (Status: Discontinued) Take 1 tablet (500 mg total) by mouth 2 (two) times daily with meals. 60 tablet 2024 Jose Caruso MD    famotidine (PEPCID) 20 MG tablet Take 1 tablet (20 mg total) by mouth 2 (two) times daily. 20 tablet 2024 Jose Caruso MD    methocarbamoL (ROBAXIN) 500 MG Tab () Take 2 tablets (1,000 mg total) by mouth 3 (three) times daily. for 5 days 30 tablet 2024 Jose Caruso MD    ondansetron (ZOFRAN-ODT) 4 MG TbDL Take 1 tablet (4 mg total) by mouth every 6 (six) hours as needed (Nausea). 12 tablet 2024 -- Jose Caruso MD    LIDOcaine (LIDODERM) 5 % Place 1 patch onto the skin once daily. Remove & Discard patch within 12 hours or as directed by MD 5 patch 2024 -- Jose Caruso MD          Follow-up Information       Follow up With Specialties Details Why Contact [facility]  Schedule an appointment as soon as possible for a visit   [address] [facility] [address] [phone]      [facility] - Emergency Dept Emergency Medicine  If symptoms worsen [address] [facility] [id number] [phone]             Jose Caruso MD  [phone]

## 2024-02-03 ENCOUNTER — HOSPITAL ENCOUNTER (EMERGENCY)
Facility: HOSPITAL | Age: 29
Discharge: HOME | End: 2024-02-04
Attending: EMERGENCY MEDICINE

## 2024-02-03 DIAGNOSIS — R10.9 ABDOMINAL PAIN, UNSPECIFIED ABDOMINAL LOCATION: Primary | ICD-10-CM

## 2024-02-03 LAB
ABS NEUT (OLG): NORMAL
ALBUMIN SERPL-MCNC: 4.1 G/DL (ref 3.5–5)
ALBUMIN/GLOB SERPL: 1.1 RATIO (ref 1.1–2)
ALP SERPL-CCNC: 67 UNIT/L (ref 40–150)
ALT SERPL-CCNC: 15 UNIT/L (ref 0–55)
APPEARANCE UR: CLEAR
AST SERPL-CCNC: 21 UNIT/L (ref 5–34)
BACTERIA #/AREA URNS AUTO: ABNORMAL /HPF
BILIRUB SERPL-MCNC: 0.7 MG/DL
BILIRUB UR QL STRIP.AUTO: NEGATIVE
BUN SERPL-MCNC: 11 MG/DL (ref 8.9–20.6)
CALCIUM SERPL-MCNC: 9 MG/DL (ref 8.4–10.2)
CHLORIDE SERPL-SCNC: 105 MMOL/L (ref 98–107)
CO2 SERPL-SCNC: 23 MMOL/L (ref 22–29)
COLOR UR AUTO: COLORLESS
CREAT SERPL-MCNC: 0.94 MG/DL (ref 0.73–1.18)
EOSINOPHIL NFR BLD MANUAL: 6 %
ERYTHROCYTE [DISTWIDTH] IN BLOOD BY AUTOMATED COUNT: 11.7 % (ref 11.5–17)
GFR SERPLBLD CREATININE-BSD FMLA CKD-EPI: >60 MLS/MIN/1.73/M2
GLOBULIN SER-MCNC: 3.6 GM/DL (ref 2.4–3.5)
GLUCOSE SERPL-MCNC: 107 MG/DL (ref 74–100)
GLUCOSE UR QL STRIP.AUTO: NORMAL
HCT VFR BLD AUTO: 46.7 % (ref 42–52)
HGB BLD-MCNC: 15.7 G/DL (ref 14–18)
KETONES UR QL STRIP.AUTO: NEGATIVE
LEUKOCYTE ESTERASE UR QL STRIP.AUTO: NEGATIVE
LIPASE SERPL-CCNC: 41 U/L
LYMPHOCYTES NFR BLD MANUAL: 14 %
MCH RBC QN AUTO: 31.2 PG (ref 27–31)
MCHC RBC AUTO-ENTMCNC: 33.6 G/DL (ref 33–36)
MCV RBC AUTO: 92.8 FL (ref 80–94)
MONOCYTES NFR BLD MANUAL: 13 %
NEUTROPHILS NFR BLD MANUAL: 66 %
NITRITE UR QL STRIP.AUTO: NEGATIVE
NRBC BLD AUTO-RTO: 0 %
PH UR STRIP.AUTO: 6 [PH]
PLATELET # BLD AUTO: 167 X10(3)/MCL (ref 130–400)
PMV BLD AUTO: 12.3 FL (ref 7.4–10.4)
POTASSIUM SERPL-SCNC: 4.3 MMOL/L (ref 3.5–5.1)
PROT SERPL-MCNC: 7.7 GM/DL (ref 6.4–8.3)
PROT UR QL STRIP.AUTO: NEGATIVE
RBC # BLD AUTO: 5.03 X10(6)/MCL (ref 4.7–6.1)
RBC #/AREA URNS AUTO: ABNORMAL /HPF
RBC UR QL AUTO: NEGATIVE
SODIUM SERPL-SCNC: 138 MMOL/L (ref 136–145)
SP GR UR STRIP.AUTO: 1 (ref 1–1.03)
SQUAMOUS #/AREA URNS LPF: ABNORMAL /HPF
UROBILINOGEN UR STRIP-ACNC: NORMAL
WBC # SPEC AUTO: 4.46 X10(3)/MCL (ref 4.5–11.5)
WBC #/AREA URNS AUTO: ABNORMAL /HPF

## 2024-02-03 PROCEDURE — 83690 ASSAY OF LIPASE: CPT | Performed by: EMERGENCY MEDICINE

## 2024-02-03 PROCEDURE — 99285 EMERGENCY DEPT VISIT HI MDM: CPT | Mod: 25

## 2024-02-03 PROCEDURE — 85027 COMPLETE CBC AUTOMATED: CPT | Performed by: EMERGENCY MEDICINE

## 2024-02-03 PROCEDURE — 63600175 PHARM REV CODE 636 W HCPCS: Performed by: EMERGENCY MEDICINE

## 2024-02-03 PROCEDURE — 81001 URINALYSIS AUTO W/SCOPE: CPT | Performed by: EMERGENCY MEDICINE

## 2024-02-03 PROCEDURE — 96372 THER/PROPH/DIAG INJ SC/IM: CPT | Performed by: EMERGENCY MEDICINE

## 2024-02-03 PROCEDURE — 80053 COMPREHEN METABOLIC PANEL: CPT | Performed by: EMERGENCY MEDICINE

## 2024-02-03 RX ORDER — DICYCLOMINE HYDROCHLORIDE 10 MG/ML
20 INJECTION INTRAMUSCULAR
Status: COMPLETED | OUTPATIENT
Start: 2024-02-03 | End: 2024-02-03

## 2024-02-03 RX ADMIN — DICYCLOMINE HYDROCHLORIDE 20 MG: 20 INJECTION, SOLUTION INTRAMUSCULAR at 11:02

## 2024-02-04 VITALS
HEIGHT: 66 IN | WEIGHT: 155 LBS | BODY MASS INDEX: 24.91 KG/M2 | SYSTOLIC BLOOD PRESSURE: 134 MMHG | DIASTOLIC BLOOD PRESSURE: 64 MMHG | RESPIRATION RATE: 21 BRPM | TEMPERATURE: 98 F | HEART RATE: 56 BPM | OXYGEN SATURATION: 96 %

## 2024-02-04 RX ORDER — DICYCLOMINE HYDROCHLORIDE 20 MG/1
20 TABLET ORAL 2 TIMES DAILY PRN
Qty: 20 TABLET | Refills: 0 | Status: SHIPPED | OUTPATIENT
Start: 2024-02-04 | End: 2024-02-14

## 2024-02-04 NOTE — ED PROVIDER NOTES
"Encounter Date: 2/3/2024    SCRIBE #1 NOTE: I, Mehrdad Cho, am scribing for, and in the presence of,  Narendra Garber MD. I have scribed the following portions of the note - Other sections scribed: HPI, ROS, PE.       History     Chief Complaint   Patient presents with    Abdominal Pain     Reports right sided abd pain X "a couple of weeks" and increasing in severity today. Denies n/v/d or urinary concerns. LBM today. Reports " I feel like my abd is swollen".      28 year old male with pmhx of appendectomy and systemic lupus erythematosus presents to ED c/o right sided abdominal pain onset 2 weeks ago. Pt states that pain feels like tightness and swelling. Pt states no exacerbation of pain with eating. Pt reports that he had pain evaluated at its start and was prescribed muscle relaxants to no relief. Pt denies nausea, vomiting, diarrhea, constipation, difficulty urinating, dysuria, hematuria, and fever.      The history is provided by the patient. No  was used.     Review of patient's allergies indicates:  No Known Allergies  Past Medical History:   Diagnosis Date    Systemic lupus erythematosus, organ or system involvement unspecified      No past surgical history on file.  No family history on file.     Review of Systems   Constitutional:  Negative for fever.   Gastrointestinal:  Positive for abdominal pain. Negative for constipation, diarrhea, nausea and vomiting.   Genitourinary:  Negative for difficulty urinating, dysuria and hematuria.       Physical Exam     Initial Vitals [02/03/24 2213]   BP Pulse Resp Temp SpO2   124/71 63 17 98 °F (36.7 °C) 97 %      MAP       --         Physical Exam    Nursing note and vitals reviewed.  Constitutional: He appears well-developed and well-nourished. No distress.   HENT:   Head: Normocephalic and atraumatic.   Eyes: Conjunctivae and EOM are normal. Pupils are equal, round, and reactive to light. Right eye exhibits no discharge. Left eye " exhibits no discharge. No scleral icterus.   Neck: No tracheal deviation present.   Cardiovascular:  Normal rate, regular rhythm, normal heart sounds and intact distal pulses.           No murmur heard.  Pulmonary/Chest: Breath sounds normal. No stridor. No respiratory distress. He has no wheezes. He has no rales.   Abdominal: Abdomen is soft. He exhibits no distension. There is no abdominal tenderness.   No right CVA tenderness.  No left CVA tenderness.   Musculoskeletal:         General: No tenderness or edema. Normal range of motion.     Neurological: He is alert and oriented to person, place, and time. He has normal strength and normal reflexes. No cranial nerve deficit.   Skin: Skin is warm and dry. No rash noted. No erythema. No pallor.   Psychiatric: He has a normal mood and affect. His behavior is normal. Judgment and thought content normal.         ED Course   Procedures  Labs Reviewed   COMPREHENSIVE METABOLIC PANEL - Abnormal; Notable for the following components:       Result Value    Glucose Level 107 (*)     Globulin 3.6 (*)     All other components within normal limits   URINALYSIS, REFLEX TO URINE CULTURE - Abnormal; Notable for the following components:    Specific Gravity, UA 1.004 (*)     All other components within normal limits   CBC WITH DIFFERENTIAL - Abnormal; Notable for the following components:    WBC 4.46 (*)     MCH 31.2 (*)     MPV 12.3 (*)     All other components within normal limits   LIPASE - Normal   CBC W/ AUTO DIFFERENTIAL    Narrative:     The following orders were created for panel order CBC auto differential.  Procedure                               Abnormality         Status                     ---------                               -----------         ------                     CBC with Differential[[id number]]       Abnormal            Final result               Manual Differential[[id number]]                             Final result                 Please view results for  these tests on the individual orders.   MANUAL DIFFERENTIAL          Imaging Results              US Abdomen Limited_Gallbladder (Preliminary result)  Result time 02/04/24 00:15:58      Preliminary result by Moreno Mejias MD (02/04/24 00:15:58)                   Narrative:    START OF REPORT:  Technique: Right upper quadrant abdominal ultrasound.    Comparison: None.    Clinical history: Ed30; ruq abdominal pain.    Findings:  Liver: Unremarkable appearing liver which measures 15cmin greatest dimension.  Mass: No discrete mass is seen.  Cyst: No definitive cyst is seen.  Biliary ducts: No extrahepatic bile duct dilatation is seen.  Billiary ducts: The common bile duct measures 2.1 mmin diameter.  Gallbladder: The gallbladder is contracted, consistent with post-prandial status. The gallbladder wall measures 2.4 mm. There are no stones seen within the contracted gallbladder.  Pancreas: The pancreas is obscured by bowel gas.  Aorta: The aorta is within normal limits.  IVC: The IVC is within normal limits.  Portal vein: Flow parameters are within normal limits with hepatopetal flow.  Kidneys: No stones hydronephrosis or mass.  Right kidney: Unremarkable appearing right kidney. The right kidney measures 10.4 x 5 x 5.6 cm. There is a simple cyst seen in the mid pole of the right kidney measuring 1.5 x 1.6 x 1.4 cm.      Impression:  1. Unremarkable right upper quadrant abdominal ultrasound. Details and other findings as above.                                         Medications   dicyclomine injection 20 mg (20 mg Intramuscular Given 2/3/24 7579)     Medical Decision Making      Differential diagnosis includes but is not limited to cholelithiasis, gastritus      Amount and/or Complexity of Data Reviewed  Labs: ordered.  Radiology: ordered.    Risk  Prescription drug management.            Scribe Attestation:   Scribe #1: I performed the above scribed service and the documentation accurately describes the services I  performed. I attest to the accuracy of the note.    Attending Attestation:           Physician Attestation for Scribe:  Physician Attestation Statement for Scribe #1: I, Narendra Garber MD, reviewed documentation, as scribed by Mehrdad Cho in my presence, and it is both accurate and complete.                                    Clinical Impression:  Final diagnoses:  [R10.9] Abdominal pain, unspecified abdominal location (Primary)          ED Disposition Condition    Discharge Stable          ED Prescriptions       Medication Sig Dispense Start Date End Date Auth. Provider    dicyclomine (BENTYL) 20 mg tablet Take 1 tablet (20 mg total) by mouth 2 (two) times daily as needed (abdominal pain). 20 tablet 2/4/2024 2/14/2024 Narendra Garber MD          Follow-up Information       Follow up With Specialties Details Why Contact Info    PCP  Call in 1 day  follow up with PCP ni 1-2 days    Alexx Friedman MD Gastroenterology In 1 week  [address] [facility] [address]  [phone]               Narendra Garber MD  02/04/24 0033

## 2024-03-22 ENCOUNTER — HOSPITAL ENCOUNTER (EMERGENCY)
Facility: HOSPITAL | Age: 29
Discharge: HOME | End: 2024-03-22
Attending: EMERGENCY MEDICINE

## 2024-03-22 VITALS
BODY MASS INDEX: 24.11 KG/M2 | HEIGHT: 66 IN | OXYGEN SATURATION: 98 % | TEMPERATURE: 98 F | SYSTOLIC BLOOD PRESSURE: 117 MMHG | HEART RATE: 55 BPM | DIASTOLIC BLOOD PRESSURE: 71 MMHG | RESPIRATION RATE: 18 BRPM | WEIGHT: 150 LBS

## 2024-03-22 DIAGNOSIS — R10.9 FLANK PAIN: Primary | ICD-10-CM

## 2024-03-22 LAB
ALBUMIN SERPL-MCNC: 4.1 G/DL (ref 3.5–5)
ALBUMIN/GLOB SERPL: 1.4 RATIO (ref 1.1–2)
ALP SERPL-CCNC: 61 UNIT/L (ref 40–150)
ALT SERPL-CCNC: 17 UNIT/L (ref 0–55)
APPEARANCE UR: ABNORMAL
AST SERPL-CCNC: 21 UNIT/L (ref 5–34)
BASOPHILS # BLD AUTO: 0.04 X10(3)/MCL
BASOPHILS NFR BLD AUTO: 1.1 %
BILIRUB SERPL-MCNC: 1.1 MG/DL
BILIRUB UR QL STRIP.AUTO: NEGATIVE
BNP BLD-MCNC: 11.9 PG/ML
BUN SERPL-MCNC: 11.5 MG/DL (ref 8.9–20.6)
CALCIUM SERPL-MCNC: 9.1 MG/DL (ref 8.4–10.2)
CHLORIDE SERPL-SCNC: 107 MMOL/L (ref 98–107)
CO2 SERPL-SCNC: 27 MMOL/L (ref 22–29)
COLOR UR AUTO: ABNORMAL
CREAT SERPL-MCNC: 1.07 MG/DL (ref 0.73–1.18)
D DIMER PPP IA.FEU-MCNC: <=0.27 UG/ML FEU (ref 0–0.5)
EOSINOPHIL # BLD AUTO: 0.19 X10(3)/MCL (ref 0–0.9)
EOSINOPHIL NFR BLD AUTO: 5.2 %
ERYTHROCYTE [DISTWIDTH] IN BLOOD BY AUTOMATED COUNT: 11.7 % (ref 11.5–17)
FLUAV AG UPPER RESP QL IA.RAPID: NOT DETECTED
FLUBV AG UPPER RESP QL IA.RAPID: NOT DETECTED
GFR SERPLBLD CREATININE-BSD FMLA CKD-EPI: >60 MLS/MIN/1.73/M2
GLOBULIN SER-MCNC: 3 GM/DL (ref 2.4–3.5)
GLUCOSE SERPL-MCNC: 77 MG/DL (ref 74–100)
GLUCOSE UR QL STRIP.AUTO: NEGATIVE
HCT VFR BLD AUTO: 43 % (ref 42–52)
HGB BLD-MCNC: 14.5 G/DL (ref 14–18)
IMM GRANULOCYTES # BLD AUTO: 0 X10(3)/MCL (ref 0–0.04)
IMM GRANULOCYTES NFR BLD AUTO: 0 %
KETONES UR QL STRIP.AUTO: NEGATIVE
LEUKOCYTE ESTERASE UR QL STRIP.AUTO: NEGATIVE
LYMPHOCYTES # BLD AUTO: 1.04 X10(3)/MCL (ref 0.6–4.6)
LYMPHOCYTES NFR BLD AUTO: 28.3 %
MCH RBC QN AUTO: 30.9 PG (ref 27–31)
MCHC RBC AUTO-ENTMCNC: 33.7 G/DL (ref 33–36)
MCV RBC AUTO: 91.7 FL (ref 80–94)
MONOCYTES # BLD AUTO: 0.54 X10(3)/MCL (ref 0.1–1.3)
MONOCYTES NFR BLD AUTO: 14.7 %
NEUTROPHILS # BLD AUTO: 1.87 X10(3)/MCL (ref 2.1–9.2)
NEUTROPHILS NFR BLD AUTO: 50.7 %
NITRITE UR QL STRIP.AUTO: NEGATIVE
NRBC BLD AUTO-RTO: 0 %
PH UR STRIP.AUTO: 8 [PH]
PLATELET # BLD AUTO: 133 X10(3)/MCL (ref 130–400)
PMV BLD AUTO: 12.2 FL (ref 7.4–10.4)
POTASSIUM SERPL-SCNC: 3.9 MMOL/L (ref 3.5–5.1)
PROT SERPL-MCNC: 7.1 GM/DL (ref 6.4–8.3)
PROT UR QL STRIP.AUTO: NEGATIVE
RBC # BLD AUTO: 4.69 X10(6)/MCL (ref 4.7–6.1)
RBC UR QL AUTO: NEGATIVE
SARS-COV-2 RNA RESP QL NAA+PROBE: NOT DETECTED
SODIUM SERPL-SCNC: 142 MMOL/L (ref 136–145)
SP GR UR STRIP.AUTO: 1.01 (ref 1–1.03)
TROPONIN I SERPL-MCNC: <0.01 NG/ML (ref 0–0.04)
UROBILINOGEN UR STRIP-ACNC: 2
WBC # SPEC AUTO: 3.68 X10(3)/MCL (ref 4.5–11.5)

## 2024-03-22 PROCEDURE — 0240U COVID/FLU A&B PCR: CPT | Performed by: EMERGENCY MEDICINE

## 2024-03-22 PROCEDURE — 81003 URINALYSIS AUTO W/O SCOPE: CPT | Performed by: EMERGENCY MEDICINE

## 2024-03-22 PROCEDURE — 93005 ELECTROCARDIOGRAM TRACING: CPT

## 2024-03-22 PROCEDURE — 85025 COMPLETE CBC W/AUTO DIFF WBC: CPT | Performed by: EMERGENCY MEDICINE

## 2024-03-22 PROCEDURE — 80053 COMPREHEN METABOLIC PANEL: CPT | Performed by: EMERGENCY MEDICINE

## 2024-03-22 PROCEDURE — 93010 ELECTROCARDIOGRAM REPORT: CPT | Mod: ,,, | Performed by: STUDENT IN AN ORGANIZED HEALTH CARE EDUCATION/TRAINING PROGRAM

## 2024-03-22 PROCEDURE — 63600175 PHARM REV CODE 636 W HCPCS: Performed by: EMERGENCY MEDICINE

## 2024-03-22 PROCEDURE — 83880 ASSAY OF NATRIURETIC PEPTIDE: CPT | Performed by: EMERGENCY MEDICINE

## 2024-03-22 PROCEDURE — 84484 ASSAY OF TROPONIN QUANT: CPT | Performed by: EMERGENCY MEDICINE

## 2024-03-22 PROCEDURE — 99284 EMERGENCY DEPT VISIT MOD MDM: CPT | Mod: 25

## 2024-03-22 PROCEDURE — 85379 FIBRIN DEGRADATION QUANT: CPT | Performed by: EMERGENCY MEDICINE

## 2024-03-22 PROCEDURE — 96374 THER/PROPH/DIAG INJ IV PUSH: CPT

## 2024-03-22 RX ORDER — IBUPROFEN 800 MG/1
800 TABLET ORAL EVERY 6 HOURS PRN
Qty: 20 TABLET | Refills: 0 | Status: ON HOLD | OUTPATIENT
Start: 2024-03-22 | End: 2024-05-03

## 2024-03-22 RX ORDER — KETOROLAC TROMETHAMINE 30 MG/ML
30 INJECTION, SOLUTION INTRAMUSCULAR; INTRAVENOUS ONCE
Status: COMPLETED | OUTPATIENT
Start: 2024-03-22 | End: 2024-03-22

## 2024-03-22 RX ORDER — METHOCARBAMOL 750 MG/1
1500 TABLET, FILM COATED ORAL 3 TIMES DAILY
Qty: 30 TABLET | Refills: 0 | Status: SHIPPED | OUTPATIENT
Start: 2024-03-22 | End: 2024-03-27

## 2024-03-22 RX ADMIN — KETOROLAC TROMETHAMINE 30 MG: 30 INJECTION, SOLUTION INTRAMUSCULAR; INTRAVENOUS at 09:03

## 2024-03-23 NOTE — ED PROVIDER NOTES
Encounter Date: 3/22/2024       History     Chief Complaint   Patient presents with    Shortness of Breath    Flank Pain       28-year-old male history of lupus in his usual state of health until 3 or 4 days ago when he says began to feel warm and short of breath.  He also has chronic left flank pain time 6 months.  He has been seen for this in the past and was told that was muscle strain.   Left flank pain is worse with rotation and has now become worse with inspiration.  No fever, cough, lower extremity pain or swelling.  Denies history of DVT as well as long car trips/plane rides.  Patient does not recall any injury and works as a manager at a fast food chain    The history is provided by the patient.     Review of patient's allergies indicates:  No Known Allergies  Past Medical History:   Diagnosis Date    Systemic lupus erythematosus, organ or system involvement unspecified      Past Surgical History:   Procedure Laterality Date    APPENDECTOMY      FRACTURE SURGERY       History reviewed. No pertinent family history.  Social History     Tobacco Use    Smoking status: Never    Smokeless tobacco: Never   Substance Use Topics    Alcohol use: Not Currently     Review of Systems   Constitutional:  Negative for chills, diaphoresis, fatigue and fever.   HENT:  Negative for congestion, ear pain, mouth sores, rhinorrhea, sinus pressure, sinus pain and sore throat.    Eyes:  Negative for discharge.   Respiratory:  Positive for shortness of breath. Negative for cough, chest tightness and wheezing.    Gastrointestinal:  Negative for abdominal pain, diarrhea, nausea and vomiting.   Genitourinary:  Positive for flank pain. Negative for dysuria, frequency and hematuria.   Musculoskeletal:  Negative for arthralgias, back pain, joint swelling and neck pain.   Skin:  Negative for pallor and rash.   Neurological:  Negative for weakness and headaches.   Psychiatric/Behavioral:  Negative for suicidal ideas.        Physical Exam      Initial Vitals [03/22/24 2111]   BP Pulse Resp Temp SpO2   117/71 65 18 97.5 °F (36.4 °C) 97 %      MAP       --         Physical Exam    Nursing note and vitals reviewed.  Constitutional: He appears well-developed and well-nourished.   HENT:   Mouth/Throat: Oropharynx is clear and moist.   Eyes: Conjunctivae are normal.   Neck: Neck supple.   Cardiovascular:  Normal heart sounds.           Pulmonary/Chest: Effort normal and breath sounds normal. No respiratory distress. He exhibits no crepitus.    +tenderness to palpation left flank.  No CVA tenderness   Abdominal: Abdomen is soft. Bowel sounds are normal.   Musculoskeletal:         General: No edema. Normal range of motion.      Cervical back: Neck supple.     Neurological: He is alert and oriented to person, place, and time. He has normal strength. GCS eye subscore is 4. GCS verbal subscore is 5. GCS motor subscore is 6.   Skin: Skin is warm and dry.         ED Course   Procedures  Labs Reviewed   URINALYSIS - Abnormal; Notable for the following components:       Result Value    Appearance, UA Cloudy (*)     Urobilinogen, UA 2.0 (*)     All other components within normal limits   CBC WITH DIFFERENTIAL - Abnormal; Notable for the following components:    WBC 3.68 (*)     RBC 4.69 (*)     MPV 12.2 (*)     Neut # 1.87 (*)     All other components within normal limits   B-TYPE NATRIURETIC PEPTIDE - Normal   COVID/FLU A&B PCR - Normal    Narrative:     The Xpert Xpress SARS-CoV-2/FLU/RSV plus is a rapid, multiplexed real-time PCR test intended for the simultaneous qualitative detection and differentiation of SARS-CoV-2, Influenza A, Influenza B, and respiratory syncytial virus (RSV) viral RNA in either nasopharyngeal swab or nasal swab specimens.         D DIMER, QUANTITATIVE - Normal   TROPONIN I - Normal   CBC W/ AUTO DIFFERENTIAL    Narrative:     The following orders were created for panel order CBC auto differential.  Procedure                                Abnormality         Status                     ---------                               -----------         ------                     CBC with Differential[[id number]]       Abnormal            Final result                 Please view results for these tests on the individual orders.   COMPREHENSIVE METABOLIC PANEL          Imaging Results              X-Ray Chest AP Portable (Final result)  Result time 03/22/24 22:11:20      Final result by Levi Adams MD (03/22/24 22:11:20)                   Impression:      NO ACUTE CARDIOPULMONARY PROCESS IDENTIFIED.      Electronically signed by: Levi Adams  Date:    03/22/2024  Time:    22:11               Narrative:    EXAMINATION:  XR CHEST AP PORTABLE    CLINICAL HISTORY:  dyspnea;    TECHNIQUE:  One    COMPARISON:  January 20, 2024.    FINDINGS:  Cardiopericardial silhouette is within normal limits. Lungs are without dense focal or segmental consolidation, congestive process, pleural effusions or pneumothorax.                                       Medications   ketorolac injection 30 mg (30 mg Intravenous Given 3/22/24 2150)     Medical Decision Making  Medical Decision Making  Problem list/ differential diagnosis including but not limited to: asthma, copd, pulmonary edema, pneumonia, aspiration, PE, pneumothorax, fibrosis, tamponade, MI, CHF, pericarditis/myocarditis, anemia, abd distension, electrolyte, acidosis, panic attack, pleural effusion, kidney stone/ infection, referred pain from spleen     Patient's chronic illnesses impacting care:  lupus    Diagnostic test considered but not ordered:    My interpretations:   EKG: Sinus bradycardia at 58.  No ST or T-wave changes.  Axis and interval normal   CXR:   No acute pathology   COVID and flu negative   Troponin, BNP and D-dimer negative.  Patient is not anemic   No blood in urinalysis or infection    Radiology reports    Discussion of case with external qualified healthcare professionals:  Not  applicable      Review of external notes( inpt, ems, NH, clinic):    Decision rules/scores:    Medications reviewed:  Medications ordered in the ER: Toradol  Discharge prescriptions:  ibuprofen and Robaxin    Social variables possible impacting patient's healthcare:    Code status/discussion    Shared decision making:    Consideration for admission versus discharge: stable for discharge      Amount and/or Complexity of Data Reviewed  Labs: ordered.  Radiology: ordered.    Risk  Prescription drug management.               ED Course as of 03/22/24 231   Fri Mar 22, 2024   1138  Patient says his pain is improved after Toradol [WC]      ED Course User Index  [WC] Eduardo Farley MD                           Clinical Impression:  Final diagnoses:  [R10.9] Flank pain (Primary)          ED Disposition Condition    Discharge Good          ED Prescriptions       Medication Sig Dispense Start Date End Date Auth. Provider    ibuprofen (ADVIL,MOTRIN) 800 MG tablet Take 1 tablet (800 mg total) by mouth every 6 (six) hours as needed for Pain. 20 tablet 3/22/2024 -- Eduardo Farley MD    methocarbamoL (ROBAXIN) 750 MG Tab Take 2 tablets (1,500 mg total) by mouth 3 (three) times daily. for 5 days 30 tablet 3/22/2024 3/27/2024 Eduardo Farley MD          Follow-up Information    None          Eduardo Farley MD  03/22/24 [id number]

## 2024-03-25 LAB
OHS QRS DURATION: 92 MS
OHS QTC CALCULATION: 392 MS

## 2024-04-30 ENCOUNTER — HOSPITAL ENCOUNTER (INPATIENT)
Facility: HOSPITAL | Age: 29
LOS: 3 days | Discharge: HOME | DRG: 885 | End: 2024-05-03
Attending: PSYCHIATRY & NEUROLOGY | Admitting: PSYCHIATRY & NEUROLOGY
Payer: MEDICAID

## 2024-04-30 ENCOUNTER — HOSPITAL ENCOUNTER (EMERGENCY)
Facility: HOSPITAL | Age: 29
Discharge: PSYCHIATRIC OR SUBSTANCE USE FACILITY | End: 2024-04-30
Attending: EMERGENCY MEDICINE

## 2024-04-30 VITALS
WEIGHT: 195 LBS | DIASTOLIC BLOOD PRESSURE: 78 MMHG | BODY MASS INDEX: 31.34 KG/M2 | OXYGEN SATURATION: 98 % | TEMPERATURE: 99 F | HEIGHT: 66 IN | HEART RATE: 62 BPM | RESPIRATION RATE: 15 BRPM | SYSTOLIC BLOOD PRESSURE: 128 MMHG

## 2024-04-30 DIAGNOSIS — F10.929 ACUTE ALCOHOLIC INTOXICATION WITH COMPLICATION: ICD-10-CM

## 2024-04-30 DIAGNOSIS — T50.901A OVERDOSE: ICD-10-CM

## 2024-04-30 DIAGNOSIS — F32.2 SEVERE MAJOR DEPRESSION: Primary | ICD-10-CM

## 2024-04-30 DIAGNOSIS — F32.A DEPRESSION: ICD-10-CM

## 2024-04-30 LAB
ALBUMIN SERPL-MCNC: 4.1 G/DL (ref 3.5–5)
ALBUMIN/GLOB SERPL: 1.1 RATIO (ref 1.1–2)
ALP SERPL-CCNC: 59 UNIT/L (ref 40–150)
ALT SERPL-CCNC: 15 UNIT/L (ref 0–55)
AMPHET UR QL SCN: NEGATIVE
APAP SERPL-MCNC: <3 UG/ML (ref 10–30)
APPEARANCE UR: CLEAR
AST SERPL-CCNC: 22 UNIT/L (ref 5–34)
BACTERIA #/AREA URNS AUTO: ABNORMAL /HPF
BARBITURATE SCN PRESENT UR: NEGATIVE
BASOPHILS # BLD AUTO: 0.04 X10(3)/MCL
BASOPHILS NFR BLD AUTO: 0.8 %
BENZODIAZ UR QL SCN: NEGATIVE
BILIRUB SERPL-MCNC: 1 MG/DL
BILIRUB UR QL STRIP.AUTO: NEGATIVE
BUN SERPL-MCNC: 8.3 MG/DL (ref 8.9–20.6)
CALCIUM SERPL-MCNC: 9 MG/DL (ref 8.4–10.2)
CANNABINOIDS UR QL SCN: POSITIVE
CHLORIDE SERPL-SCNC: 111 MMOL/L (ref 98–107)
CO2 SERPL-SCNC: 21 MMOL/L (ref 22–29)
COCAINE UR QL SCN: NEGATIVE
COLOR UR AUTO: COLORLESS
CREAT SERPL-MCNC: 0.92 MG/DL (ref 0.73–1.18)
EOSINOPHIL # BLD AUTO: 0.18 X10(3)/MCL (ref 0–0.9)
EOSINOPHIL NFR BLD AUTO: 3.5 %
ERYTHROCYTE [DISTWIDTH] IN BLOOD BY AUTOMATED COUNT: 11.7 % (ref 11.5–17)
ETHANOL SERPL-MCNC: 174 MG/DL
ETHANOL SERPL-MCNC: 241 MG/DL
FENTANYL UR QL SCN: NEGATIVE
GFR SERPLBLD CREATININE-BSD FMLA CKD-EPI: >60 MLS/MIN/1.73/M2
GLOBULIN SER-MCNC: 3.6 GM/DL (ref 2.4–3.5)
GLUCOSE SERPL-MCNC: 86 MG/DL (ref 74–100)
GLUCOSE UR QL STRIP.AUTO: NORMAL
HCT VFR BLD AUTO: 45.7 % (ref 42–52)
HGB BLD-MCNC: 15.4 G/DL (ref 14–18)
IMM GRANULOCYTES # BLD AUTO: 0.05 X10(3)/MCL (ref 0–0.04)
IMM GRANULOCYTES NFR BLD AUTO: 1 %
KETONES UR QL STRIP.AUTO: NEGATIVE
LEUKOCYTE ESTERASE UR QL STRIP.AUTO: NEGATIVE
LYMPHOCYTES # BLD AUTO: 1.34 X10(3)/MCL (ref 0.6–4.6)
LYMPHOCYTES NFR BLD AUTO: 26.1 %
MCH RBC QN AUTO: 30.7 PG (ref 27–31)
MCHC RBC AUTO-ENTMCNC: 33.7 G/DL (ref 33–36)
MCV RBC AUTO: 91 FL (ref 80–94)
MDMA UR QL SCN: NEGATIVE
MONOCYTES # BLD AUTO: 0.65 X10(3)/MCL (ref 0.1–1.3)
MONOCYTES NFR BLD AUTO: 12.6 %
NEUTROPHILS # BLD AUTO: 2.88 X10(3)/MCL (ref 2.1–9.2)
NEUTROPHILS NFR BLD AUTO: 56 %
NITRITE UR QL STRIP.AUTO: NEGATIVE
NRBC BLD AUTO-RTO: 0 %
OHS QRS DURATION: 96 MS
OHS QTC CALCULATION: 417 MS
OPIATES UR QL SCN: NEGATIVE
PCP UR QL: NEGATIVE
PH UR STRIP.AUTO: 5.5 [PH]
PH UR: 5.5 [PH] (ref 3–11)
PLATELET # BLD AUTO: 130 X10(3)/MCL (ref 130–400)
PMV BLD AUTO: 12.3 FL (ref 7.4–10.4)
POTASSIUM SERPL-SCNC: 3.9 MMOL/L (ref 3.5–5.1)
PROT SERPL-MCNC: 7.7 GM/DL (ref 6.4–8.3)
PROT UR QL STRIP.AUTO: NEGATIVE
RBC # BLD AUTO: 5.02 X10(6)/MCL (ref 4.7–6.1)
RBC #/AREA URNS AUTO: ABNORMAL /HPF
RBC UR QL AUTO: NEGATIVE
SALICYLATES SERPL-MCNC: <5 MG/DL (ref 15–30)
SARS-COV-2 RDRP RESP QL NAA+PROBE: NEGATIVE
SODIUM SERPL-SCNC: 142 MMOL/L (ref 136–145)
SP GR UR STRIP.AUTO: 1 (ref 1–1.03)
SPECIFIC GRAVITY, URINE AUTO (.000) (OHS): 1 (ref 1–1.03)
SQUAMOUS #/AREA URNS LPF: ABNORMAL /HPF
UROBILINOGEN UR STRIP-ACNC: NORMAL
WBC # SPEC AUTO: 5.14 X10(3)/MCL (ref 4.5–11.5)
WBC #/AREA URNS AUTO: ABNORMAL /HPF

## 2024-04-30 PROCEDURE — 11400000 HC PSYCH PRIVATE ROOM

## 2024-04-30 PROCEDURE — 63600175 PHARM REV CODE 636 W HCPCS: Performed by: EMERGENCY MEDICINE

## 2024-04-30 PROCEDURE — 93005 ELECTROCARDIOGRAM TRACING: CPT

## 2024-04-30 PROCEDURE — 80143 DRUG ASSAY ACETAMINOPHEN: CPT | Performed by: EMERGENCY MEDICINE

## 2024-04-30 PROCEDURE — 81001 URINALYSIS AUTO W/SCOPE: CPT | Mod: XB | Performed by: EMERGENCY MEDICINE

## 2024-04-30 PROCEDURE — 80179 DRUG ASSAY SALICYLATE: CPT | Performed by: EMERGENCY MEDICINE

## 2024-04-30 PROCEDURE — 96374 THER/PROPH/DIAG INJ IV PUSH: CPT

## 2024-04-30 PROCEDURE — 96361 HYDRATE IV INFUSION ADD-ON: CPT

## 2024-04-30 PROCEDURE — 85025 COMPLETE CBC W/AUTO DIFF WBC: CPT | Performed by: EMERGENCY MEDICINE

## 2024-04-30 PROCEDURE — 93010 ELECTROCARDIOGRAM REPORT: CPT | Mod: ,,, | Performed by: INTERNAL MEDICINE

## 2024-04-30 PROCEDURE — 87635 SARS-COV-2 COVID-19 AMP PRB: CPT | Performed by: EMERGENCY MEDICINE

## 2024-04-30 PROCEDURE — 99285 EMERGENCY DEPT VISIT HI MDM: CPT | Mod: 25

## 2024-04-30 PROCEDURE — 82077 ASSAY SPEC XCP UR&BREATH IA: CPT | Performed by: EMERGENCY MEDICINE

## 2024-04-30 PROCEDURE — 80053 COMPREHEN METABOLIC PANEL: CPT | Performed by: EMERGENCY MEDICINE

## 2024-04-30 PROCEDURE — 80307 DRUG TEST PRSMV CHEM ANLYZR: CPT | Performed by: EMERGENCY MEDICINE

## 2024-04-30 RX ORDER — ALUMINUM HYDROXIDE, MAGNESIUM HYDROXIDE, AND SIMETHICONE 1200; 120; 1200 MG/30ML; MG/30ML; MG/30ML
30 SUSPENSION ORAL EVERY 6 HOURS PRN
Status: DISCONTINUED | OUTPATIENT
Start: 2024-04-30 | End: 2024-05-03 | Stop reason: HOSPADM

## 2024-04-30 RX ORDER — TRAZODONE HYDROCHLORIDE 100 MG/1
100 TABLET ORAL NIGHTLY PRN
Status: DISCONTINUED | OUTPATIENT
Start: 2024-04-30 | End: 2024-05-03 | Stop reason: HOSPADM

## 2024-04-30 RX ORDER — ONDANSETRON HYDROCHLORIDE 2 MG/ML
4 INJECTION, SOLUTION INTRAVENOUS
Status: COMPLETED | OUTPATIENT
Start: 2024-04-30 | End: 2024-04-30

## 2024-04-30 RX ORDER — ACETAMINOPHEN 325 MG/1
650 TABLET ORAL EVERY 6 HOURS PRN
Status: DISCONTINUED | OUTPATIENT
Start: 2024-04-30 | End: 2024-05-03 | Stop reason: HOSPADM

## 2024-04-30 RX ORDER — IBUPROFEN 200 MG
1 TABLET ORAL DAILY
Status: DISCONTINUED | OUTPATIENT
Start: 2024-05-01 | End: 2024-05-03 | Stop reason: HOSPADM

## 2024-04-30 RX ORDER — HYDROXYZINE HYDROCHLORIDE 50 MG/1
50 TABLET, FILM COATED ORAL EVERY 4 HOURS PRN
Status: DISCONTINUED | OUTPATIENT
Start: 2024-04-30 | End: 2024-05-03 | Stop reason: HOSPADM

## 2024-04-30 RX ADMIN — SODIUM CHLORIDE, POTASSIUM CHLORIDE, SODIUM LACTATE AND CALCIUM CHLORIDE 1000 ML: 600; 310; 30; 20 INJECTION, SOLUTION INTRAVENOUS at 02:04

## 2024-04-30 RX ADMIN — ONDANSETRON 4 MG: 2 INJECTION INTRAMUSCULAR; INTRAVENOUS at 02:04

## 2024-04-30 NOTE — ED NOTES
Consent: Written consent obtained, risks reviewed including but not limited to crusting, scabbing, blistering, scarring, darker or lighter pigmentary change, incidental hair removal, bruising, and/or incomplete removal. Pt placed in roll belt, room near nurses station, cardiac monitor in place. SpO2 monitor in place. Door open for staff visibility.   Delay Time (Ms): 20 Pulse Duration: 10 ms Spot Size: 7 mm Pulse Count: 293 Treated Area: large area Post-Procedure Care: Post care reviewed with patient. Spot Size: 10 mm Detail Level: Simple Pulse Duration: 1.5 ms Cryogen Time (Ms): 30 Laser Type: Vbeam Perfecta 595nm Fluence In J/Cm2 (Optional): 6 Post-Care Instructions: I reviewed with the patient in detail post-care instructions. Patient should stay away from the sun and wear sun protection until treated areas are fully healed. Immediate Endpoint: purpura Location (Required For Details To Render In Note But Body Touch Will Also Count For First Location): right arm Pulse Count: 329

## 2024-04-30 NOTE — ED PROVIDER NOTES
Encounter Date: 4/30/2024    SCRIBE #1 NOTE: I, Darrius Gonzalez, am scribing for, and in the presence of,  Jocelyne Cool MD. I have scribed the following portions of the note - Other sections scribed: HPI,ROS,PE.       History     Chief Complaint   Patient presents with    Alcohol Intoxication     Pt girlfriend broke up with him, drank copious amount of hard liquor in approx 40 minute time frame. Denies SI, HI. Pt intoxicated, GCS 13     29 y/o male with Pmhx of systemic lupus erythematosus presents to ED via EMS c/o alcohol intoxication. EMS reports the pt's roommate called because his girlfriend recently broke up with him and he drank ~400 mL of Chintan liquor within 30-40 mins ~1030. Ems reports CBG 86.       ROS and history limited due to condition of pt.     PEC time 1400    The history is provided by the EMS personnel. The history is limited by the condition of the patient. No  was used.     Review of patient's allergies indicates:  No Known Allergies  Past Medical History:   Diagnosis Date    Systemic lupus erythematosus, organ or system involvement unspecified      Past Surgical History:   Procedure Laterality Date    APPENDECTOMY      FRACTURE SURGERY       No family history on file.  Social History     Tobacco Use    Smoking status: Never    Smokeless tobacco: Never   Substance Use Topics    Alcohol use: Yes     Review of Systems   Unable to perform ROS: Acuity of condition       Physical Exam     Initial Vitals [04/30/24 1336]   BP Pulse Resp Temp SpO2   (!) 96/50 72 14 98.6 °F (37 °C) 97 %      MAP       --         Physical Exam    Nursing note and vitals reviewed.  Constitutional: No distress.   Clearly intoxicated.    HENT:   Head: Normocephalic and atraumatic.   Pt drooling.    Eyes: Conjunctivae and EOM are normal. Pupils are equal, round, and reactive to light.   Neck: Trachea normal. Neck supple.   Normal range of motion.  Cardiovascular:  Normal rate and regular rhythm.            No murmur heard.  Pulmonary/Chest: Breath sounds normal. No respiratory distress. He exhibits no tenderness.   Abdominal: Abdomen is soft. Bowel sounds are normal. There is no abdominal tenderness.   Musculoskeletal:         General: Normal range of motion.      Cervical back: Normal range of motion and neck supple.      Lumbar back: Normal. No tenderness. Normal range of motion.     Neurological:   Eyes track to stimulus.   Pt nonverbal.   Moves all extremities    Skin: Skin is warm and dry.         ED Course   Procedures  Labs Reviewed   COMPREHENSIVE METABOLIC PANEL - Abnormal; Notable for the following components:       Result Value    Chloride 111 (*)     Carbon Dioxide 21 (*)     Blood Urea Nitrogen 8.3 (*)     Globulin 3.6 (*)     All other components within normal limits   ALCOHOL,MEDICAL (ETHANOL) - Abnormal; Notable for the following components:    Ethanol Level 241.0 (*)     All other components within normal limits   ACETAMINOPHEN LEVEL - Abnormal; Notable for the following components:    Acetaminophen Level <3.0 (*)     All other components within normal limits   SALICYLATE LEVEL - Abnormal; Notable for the following components:    Salicylate Level <5.0 (*)     All other components within normal limits   CBC WITH DIFFERENTIAL - Abnormal; Notable for the following components:    MPV 12.3 (*)     IG# 0.05 (*)     All other components within normal limits   URINALYSIS, REFLEX TO URINE CULTURE - Abnormal; Notable for the following components:    Specific Gravity, UA 1.003 (*)     All other components within normal limits   CBC W/ AUTO DIFFERENTIAL    Narrative:     The following orders were created for panel order CBC auto differential.  Procedure                               Abnormality         Status                     ---------                               -----------         ------                     CBC with Differential[[id number]]       Abnormal            Final result                 Please  view results for these tests on the individual orders.   DRUG SCREEN, URINE (BEAKER)        ECG Results              EKG 12-lead (Final result)        Collection Time Result Time QRS Duration OHS QTC Calculation    04/30/24 14:06:38 04/30/24 15:08:18 96 417                     Final result by Interface, Lab In [facility] (04/30/24 15:08:23)                   Narrative:    Test Reason : T50.901A,    Vent. Rate : 069 BPM     Atrial Rate : 069 BPM     P-R Int : 384 ms          QRS Dur : 096 ms      QT Int : 390 ms       P-R-T Axes : 000 063 054 degrees     QTc Int : 417 ms    Sinus rhythm with 1st degree A-V block  Otherwise normal ECG  When compared with ECG of 22-MAR-2024 21:32,  OR interval has increased  Confirmed by Erick Mendoza MD ([id number]) on 4/30/2024 3:08:14 PM    Referred By: [email or web address]   SELF           Confirmed By:Erick Mendoza MD                                  Imaging Results    None          Medications   lactated ringers bolus 1,000 mL (0 mLs Intravenous Stopped 4/30/24 1515)   ondansetron injection 4 mg (4 mg Intravenous Given 4/30/24 1415)     Medical Decision Making  The differential diagnosis includes, but is not limited to, depression, suicide attempt, and drug/etoh intoxication  Cbc, cmp, ethanol, acetaminophen, ua, uds EKG ordered and reviewed  Labs urnemarkable other tahn elevated etoh  Severe depression, possible si pec in plcae  Medically claered.     Problems Addressed:  Acute alcoholic intoxication with complication: acute illness or injury that poses a threat to life or bodily functions  Overdose: acute illness or injury that poses a threat to life or bodily functions  Severe major depression: acute illness or injury that poses a threat to life or bodily functions    Amount and/or Complexity of Data Reviewed  Independent Historian: EMS     Details: EMS reports the pt's roommate called because his girlfriend recently broke up with him and he drank ~400 mL of Chintan liquor within 30-40 mins ~1030.  Ems reports CBG 86.   External Data Reviewed: notes.     Details: Previous visits for unrelated issues  Labs: ordered. Decision-making details documented in ED Course.  ECG/medicine tests: ordered and independent interpretation performed.    Risk  OTC drugs.  Prescription drug management.      Additional MDM:   Psych: A Physician Emergency Certificate (PEC) was done in the ED for: Gravely Disabled. The patient has been medically cleared. Outcome: The patient was approved for transfer to another facility.        Scribe Attestation:   Scribe #1: I performed the above scribed service and the documentation accurately describes the services I performed. I attest to the accuracy of the note.  Comments: Attending:   Physician Attestation Statement for Scribe #1: Jocelyne OVALLE MD, personally performed the services described in this documentation. All medical record entries made by the scribe were at my direction and in my presence.  I have reviewed the chart and agree that the record reflects my personal performance and is accurate and complete.        Attending Attestation:           Physician Attestation for Scribe:  Physician Attestation Statement for Scribe #1: Travon OVALLE Brooke R, MD, reviewed documentation, as scribed by Darrius jessica in my presence, and it is both accurate and complete.             ED Course as of 04/30/24 1810 Tue Apr 30, 2024   1400 PEC time 1400 [JOSELYN]   1415 EKG performed at 2:06 p.m. rate of 69 sinus rhythm with first-degree AV block  [BS]   1517 Alcohol, Serum(!): 241.0 [BS]   1620 Patient is still significantly intoxicated [BS]   1702 Patient was now awake states that he has been severely depressed since a break up 2 days ago.  He was not been sleeping or eating very well.  He denies that this was a suicide attempt but understands that it was concerning amount of alcohol to ingest and is agreeable with inpatient psychiatric treatment [BS]      ED Course User Index  [BS]  Jocelyne Cool MD  [JOSELYN] Darrius Gonzalez         Medically cleared for psychiatry placement: 4/30/2024  6:10 PM                   Clinical Impression:  Final diagnoses:  [T50.901A] Overdose  [F32.2] Severe major depression (Primary)  [F10.929] Acute alcoholic intoxication with complication          ED Disposition Condition    Transfer to Psych Facility Stable          ED Prescriptions    None       Follow-up Information    None          Jocelyne Cool MD  04/30/24 [id number]

## 2024-05-01 PROBLEM — F12.10 CANNABIS ABUSE: Status: ACTIVE | Noted: 2024-05-01

## 2024-05-01 PROBLEM — F39 MILD MOOD DISORDER: Status: ACTIVE | Noted: 2024-05-01

## 2024-05-01 PROBLEM — F41.9 ANXIETY DISORDER: Status: ACTIVE | Noted: 2024-05-01

## 2024-05-01 PROBLEM — F12.90 MARIJUANA USE: Status: ACTIVE | Noted: 2024-05-01

## 2024-05-01 PROBLEM — F10.10 ALCOHOL ABUSE: Status: ACTIVE | Noted: 2024-05-01

## 2024-05-01 LAB
ALBUMIN SERPL-MCNC: 4.1 G/DL (ref 3.5–5)
ALBUMIN/GLOB SERPL: 1.4 RATIO (ref 1.1–2)
ALP SERPL-CCNC: 65 UNIT/L (ref 40–150)
ALT SERPL-CCNC: 16 UNIT/L (ref 0–55)
AST SERPL-CCNC: 20 UNIT/L (ref 5–34)
BASOPHILS # BLD AUTO: 0.02 X10(3)/MCL
BASOPHILS NFR BLD AUTO: 0.3 %
BILIRUB SERPL-MCNC: 1.2 MG/DL
BUN SERPL-MCNC: 6.9 MG/DL (ref 8.9–20.6)
CALCIUM SERPL-MCNC: 9.2 MG/DL (ref 8.4–10.2)
CHLORIDE SERPL-SCNC: 107 MMOL/L (ref 98–107)
CHOLEST SERPL-MCNC: 109 MG/DL
CHOLEST/HDLC SERPL: 3 {RATIO} (ref 0–5)
CO2 SERPL-SCNC: 26 MMOL/L (ref 22–29)
CREAT SERPL-MCNC: 1.06 MG/DL (ref 0.73–1.18)
EOSINOPHIL # BLD AUTO: 0.07 X10(3)/MCL (ref 0–0.9)
EOSINOPHIL NFR BLD AUTO: 1 %
ERYTHROCYTE [DISTWIDTH] IN BLOOD BY AUTOMATED COUNT: 11.6 % (ref 11.5–17)
EST. AVERAGE GLUCOSE BLD GHB EST-MCNC: 93.9 MG/DL
GFR SERPLBLD CREATININE-BSD FMLA CKD-EPI: >60 MLS/MIN/1.73/M2
GLOBULIN SER-MCNC: 2.9 GM/DL (ref 2.4–3.5)
GLUCOSE SERPL-MCNC: 72 MG/DL (ref 74–100)
HBA1C MFR BLD: 4.9 %
HCT VFR BLD AUTO: 47 % (ref 42–52)
HDLC SERPL-MCNC: 42 MG/DL (ref 35–60)
HGB BLD-MCNC: 15.8 G/DL (ref 14–18)
IMM GRANULOCYTES # BLD AUTO: 0.02 X10(3)/MCL (ref 0–0.04)
IMM GRANULOCYTES NFR BLD AUTO: 0.3 %
LDLC SERPL CALC-MCNC: 46 MG/DL (ref 50–140)
LYMPHOCYTES # BLD AUTO: 0.74 X10(3)/MCL (ref 0.6–4.6)
LYMPHOCYTES NFR BLD AUTO: 11 %
MCH RBC QN AUTO: 30.9 PG (ref 27–31)
MCHC RBC AUTO-ENTMCNC: 33.6 G/DL (ref 33–36)
MCV RBC AUTO: 91.8 FL (ref 80–94)
MONOCYTES # BLD AUTO: 0.52 X10(3)/MCL (ref 0.1–1.3)
MONOCYTES NFR BLD AUTO: 7.7 %
NEUTROPHILS # BLD AUTO: 5.34 X10(3)/MCL (ref 2.1–9.2)
NEUTROPHILS NFR BLD AUTO: 79.7 %
NRBC BLD AUTO-RTO: 0 %
PLATELET # BLD AUTO: 147 X10(3)/MCL (ref 130–400)
PLATELETS.RETICULATED NFR BLD AUTO: 15.6 % (ref 0.9–11.2)
PMV BLD AUTO: 12.9 FL (ref 7.4–10.4)
POTASSIUM SERPL-SCNC: 4.2 MMOL/L (ref 3.5–5.1)
PROT SERPL-MCNC: 7 GM/DL (ref 6.4–8.3)
RBC # BLD AUTO: 5.12 X10(6)/MCL (ref 4.7–6.1)
SODIUM SERPL-SCNC: 143 MMOL/L (ref 136–145)
T PALLIDUM AB SER QL: NONREACTIVE
TRIGL SERPL-MCNC: 107 MG/DL (ref 34–140)
TSH SERPL-ACNC: 0.52 UIU/ML (ref 0.35–4.94)
VLDLC SERPL CALC-MCNC: 21 MG/DL
WBC # SPEC AUTO: 6.71 X10(3)/MCL (ref 4.5–11.5)

## 2024-05-01 PROCEDURE — 11400000 HC PSYCH PRIVATE ROOM

## 2024-05-01 PROCEDURE — 85025 COMPLETE CBC W/AUTO DIFF WBC: CPT | Performed by: PSYCHIATRY & NEUROLOGY

## 2024-05-01 PROCEDURE — 86780 TREPONEMA PALLIDUM: CPT | Performed by: PSYCHIATRY & NEUROLOGY

## 2024-05-01 PROCEDURE — 80053 COMPREHEN METABOLIC PANEL: CPT | Performed by: PSYCHIATRY & NEUROLOGY

## 2024-05-01 PROCEDURE — 83036 HEMOGLOBIN GLYCOSYLATED A1C: CPT | Performed by: PSYCHIATRY & NEUROLOGY

## 2024-05-01 PROCEDURE — 84443 ASSAY THYROID STIM HORMONE: CPT | Performed by: PSYCHIATRY & NEUROLOGY

## 2024-05-01 PROCEDURE — 36415 COLL VENOUS BLD VENIPUNCTURE: CPT | Performed by: PSYCHIATRY & NEUROLOGY

## 2024-05-01 PROCEDURE — 80061 LIPID PANEL: CPT | Performed by: PSYCHIATRY & NEUROLOGY

## 2024-05-01 PROCEDURE — 25000003 PHARM REV CODE 250: Performed by: PSYCHIATRY & NEUROLOGY

## 2024-05-01 RX ORDER — ONDANSETRON 4 MG/1
4 TABLET, ORALLY DISINTEGRATING ORAL EVERY 6 HOURS PRN
Status: DISCONTINUED | OUTPATIENT
Start: 2024-05-01 | End: 2024-05-03 | Stop reason: HOSPADM

## 2024-05-01 RX ADMIN — TRAZODONE HYDROCHLORIDE 100 MG: 100 TABLET ORAL at 12:05

## 2024-05-01 RX ADMIN — ONDANSETRON 4 MG: 4 TABLET, ORALLY DISINTEGRATING ORAL at 03:05

## 2024-05-01 RX ADMIN — HYDROXYZINE HYDROCHLORIDE 50 MG: 50 TABLET, FILM COATED ORAL at 08:05

## 2024-05-01 RX ADMIN — TRAZODONE HYDROCHLORIDE 100 MG: 100 TABLET ORAL at 08:05

## 2024-05-01 RX ADMIN — HYDROXYZINE HYDROCHLORIDE 50 MG: 50 TABLET, FILM COATED ORAL at 03:05

## 2024-05-01 NOTE — PLAN OF CARE
Problem: Adult Behavioral Health Plan of Care  Goal: Patient-Specific Goal (Individualization)  Flowsheets (Taken 5/1/2024 0906)  Patient Personal Strengths:   ability to maintain sobriety   insight into illness/situation   no history of violence   positive educational history   stable living environment  Patient Vulnerabilities: recent loss  Individualized Care Needs: Grief Counseling

## 2024-05-01 NOTE — NURSING
Patient evaluated for anxiety after given Hydroxyzine 50 mg one PO  at 854 am.sated anxiety has decreased.

## 2024-05-01 NOTE — PROGRESS NOTES
"Олег is a 29y/o male admitted for Mood Disorder (F39),  Anxiety (F41.9), Alcohol use disorder (F10.10), and Cannabis use disorder (F12.10) with a uds +cannabis and 241BAL. CTRS met with Pt 1:1 at bedside, Олег was pleasant and cooperative, reporting some depression and anxiety, ability to perform his ADL's, and PMHz of Tracee. CTRS educated Pt to TR group times and dates with Олег agreeing to attend and participate in TR groups. Олег reported his treatment goal as "A change in myself".       05/01/24 0807   General   Admit Date 04/30/24   Primary Diagnosis Mood Disorder (F39),  Anxiety (F41.9)   Secondary Diagnosis Alcohol use disorder (F10.10), and Cannabis use disorder (F12.10)   Sikhism none   Number of Children 0   Children Living? 0   Occupation unemployed   Does the patient have dentures? No   If you were to take part in activities, which of the following would you prefer? Both   Do you feel like you have enough to keep you busy now? Yes   Do you believe that you have the opportunity for physical activity? Yes   Activity Capabilities Maximum   Subjective   Patient states Trying to drink to relax and forget, I have some stuff going on   Assessment   Mobility ambulates independently   Transfers independently   Musculoskeletal   (none)   Visual Acuity normal vision   Visual Perception depth perception;color perception;recognizes letters;recognizes numbers   Hearing normal   Speech/Communication normal   Cognitive Concerns oriented x4   Emotional Concerns appears depressed;appears anxious   Leisure Interest Survey   Leisure Interest Survey Yes   Social/Group Activities   Group Discuss Current Interest   Current Events Current Interest   Solitary Activities   Computer Activities Current Interest   Word Search Puzzles Current Interest   Watching Videos Current Interest   Reading Current Interest   Physical Activities   Fitness/Exercise Programs Current Interest   Weightlifting Current Interest   Walk/Run Current " Interest   Other Physical Activity Other (see comment)  (yoga)   Creative Activities   Drawing Current Interest   Painting Current Interest   Creative Writing Current Interest   Cooking Current Interest   Outdoor Activities   Hiking Current Interest   Spectator Events   Movies Current Interest   Passive Games   Classic Board Games Current Interest   Card Games Current Interest   Goals   Additional Documentation yes   Goal Formulation With patient   Time For Goal Achievement 7 days   Goal 1 A change in myself   Goal 1-Progress ongoing-   Plan   Planned Therapy Intervention Group Recreational Therapy   Expected Length of Stay 5-7days   PT Frequency Minimum of 3 visits per week

## 2024-05-01 NOTE — PROGRESS NOTES
05/01/24 1400   [facility] Group Therapy   Group Name Therapeutic Recreation   Specific Interventions Skilled Activity Leisure Education and Awareness   Participation Level None   Participation Quality Conflict w/ Other;Services  (attending ADL's)

## 2024-05-01 NOTE — NURSING
Patient complained of anxiety at this time, Patient asking for anxiety medication.Hydroxyzine 50 mg one by mouth  given for anxiety.

## 2024-05-01 NOTE — CARE UPDATE
Treatment Team    Pt seem for treatment team today with interdisciplinary team.  Pt is Cooperative with Tx team. Pt reports symptoms at this time. MD did not change pt meds at this time. Treatment teams goals Not met at this time. Pt DC plan is home. DC date scheduled for 5.3.2024. Pt verbalized understanding of care plan and agreed to being discharged to [facility] at this time. Pt verbalized meeting with [facility] on 5.1.2024.Pt is open to ind. Therapy if insurance comes through before pt is dc.
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
## 2024-05-01 NOTE — NURSING
Patient stated he needed medication for anxiety, due to being new to facility and the surroundings and the adjustment.Given Hydroxyzine 50 mg tablet one by mouth anxiety .

## 2024-05-01 NOTE — PLAN OF CARE
Problem: Adult Behavioral Health Plan of Care  Goal: Plan of Care Review  Outcome: Progressing  Flowsheets (Taken 5/1/2024 0743)  Patient Agreement with Plan of Care: agrees  Plan of Care Reviewed With: patient  Goal: Patient-Specific Goal (Individualization)  Outcome: Progressing  Flowsheets (Taken 5/1/2024 0743)  Patient Personal Strengths: ability to maintain sobriety  Patient Vulnerabilities: recent loss  Goal: Adheres to Safety Considerations for Self and Others  Outcome: Progressing  Flowsheets (Taken 5/1/2024 0743)  Adheres to Safety Considerations for Self and Others: making progress toward outcome  Intervention: Develop and Maintain Individualized Safety Plan  Flowsheets (Taken 5/1/2024 0743)  Safety Measures: safety rounds completed  Goal: Absence of New-Onset Illness or Injury  Outcome: Progressing  Intervention: Identify and Manage Fall Risk  Flowsheets (Taken 5/1/2024 0743)  Safety Measures: safety rounds completed  Intervention: Prevent VTE (Venous Thromboembolism)  Flowsheets (Taken 5/1/2024 0743)  VTE Prevention/Management: fluids promoted  Intervention: Prevent Infection  Flowsheets (Taken 5/1/2024 0743)  Infection Prevention: hand hygiene promoted  Goal: Optimized Coping Skills in Response to Life Stressors  Outcome: Progressing  Flowsheets (Taken 5/1/2024 0743)  Optimized Coping Skills in Response to Life Stressors: making progress toward outcome  Intervention: Promote Effective Coping Strategies  Flowsheets (Taken 5/1/2024 0743)  Supportive Measures: self-care encouraged  Goal: Develops/Participates in Therapeutic [place] to Support Successful Transition  Outcome: Progressing  Flowsheets (Taken 5/1/2024 0743)  Develops/Participates in Therapeutic [place] to Support Successful Transition: making progress toward outcome  Intervention: Foster Therapeutic [place]  Flowsheets (Taken 5/1/2024 0743)  Trust Relationship/Rapport: care explained  Goal: Rounds/Family Conference  Outcome:  Progressing  Flowsheets (Taken 5/1/2024 0743)  Participants:   nursing   milieu/psych techs     Problem: Depressive Signs/Symptoms  Goal: Optimized Energy Level (Depressive Signs/Symptoms)  Outcome: Progressing  Flowsheets (Taken 5/1/2024 0743)  Mutually Determined Action Steps (Optimized Energy Level): grooms self without prompting  Intervention: Optimize Energy Level  Flowsheets (Taken 5/1/2024 0743)  Activity (Behavioral Health): up in chair  Patient Performed Hygiene: teeth brushed  Diversional Activity: television  Goal: Optimized Cognitive Function (Depressive Signs/Symptoms)  Outcome: Progressing  Flowsheets (Taken 5/1/2024 0743)  Mutually Determined Action Steps (Optimized Cognitive Function): remains focused during activity  Goal: Increased Participation and Engagement (Depressive Signs/Symptoms)  Outcome: Progressing  Flowsheets (Taken 5/1/2024 0743)  Mutually Determined Action Steps (Increased Participation and Engagement): participates in one or more activity  Intervention: Facilitate Participation and Engagement  Flowsheets (Taken 5/1/2024 0743)  Supportive Measures: self-care encouraged  Diversional Activity: television  Goal: Enhanced Self-Esteem and Confidence (Depressive Signs/Symptoms)  Outcome: Progressing  Flowsheets (Taken 5/1/2024 0743)  Mutually Determined Action Steps (Enhanced Self-Esteem and Confidence): identifies judgmental thoughts  Intervention: Promote Confidence and Self-Esteem  Flowsheets (Taken 5/1/2024 0743)  Supportive Measures: self-care encouraged  Goal: Improved Mood Symptoms (Depressive Signs/Symptoms)  Outcome: Progressing  Flowsheets (Taken 5/1/2024 0743)  Mutually Determined Action Steps (Improved Mood Symptoms): acknowledges progress  Intervention: Promote Mood Improvement  Flowsheets (Taken 5/1/2024 0743)  Supportive Measures: self-care encouraged  Diversional Activity: television  Goal: Optimized Nutrition Intake (Depressive Signs/Symptoms)  Outcome:  Progressing  Flowsheets (Taken 5/1/2024 0743)  Mutually Determined Action Steps (Optimized Nutrition Intake): eats at meal time  Intervention: Promote Optimal Nutrition Intake  Flowsheets (Taken 5/1/2024 0743)  Nutrition Interventions: food preferences provided  Bowel Elimination Promotion: adequate fluid intake promoted  Goal: Improved Psychomotor Symptoms (Depressive Signs/Symptoms)  Outcome: Progressing  Flowsheets (Taken 5/1/2024 0743)  Mutually Determined Action Steps (Improved Psychomotor Symptoms): adheres to medication regimen  Intervention: Manage Psychomotor Movement  Flowsheets (Taken 5/1/2024 0743)  Activity (Behavioral Health): up in chair  Patient Performed Hygiene: teeth brushed  Diversional Activity: television  Goal: Improved Sleep (Depressive Signs/Symptoms)  Outcome: Progressing  Flowsheets (Taken 5/1/2024 0743)  Mutually Determined Action Steps (Improved Sleep): sleeps 4-6 hours at night  Intervention: Promote Healthy Sleep Hygiene  Flowsheets (Taken 5/1/2024 0743)  Sleep Hygiene Promotion: regular sleep pattern promoted  Goal: Enhanced Social, Occupational or Functional Skills (Depressive Signs/Symptoms)  Outcome: Progressing  Flowsheets (Taken 5/1/2024 0743)  Mutually Determined Action Steps (Enhanced Social, Occupational or Functional Skills): identifies personal strengths  Intervention: Promote Social, Occupational and Functional Ability  Flowsheets (Taken 5/1/2024 0743)  Social Functional Ability Promotion: autonomy promoted     Problem: Excessive Substance Use  Goal: Optimized Energy Level (Excessive Substance Use)  Outcome: Progressing  Flowsheets (Taken 5/1/2024 0743)  Mutually Determined Action Steps (Optimized Energy Level): grooms self without prompting  Intervention: Optimize Energy Level  Flowsheets (Taken 5/1/2024 0743)  Activity (Behavioral Health): up in chair  Patient Performed Hygiene: teeth brushed  Diversional Activity: television  Goal: Improved Behavioral Control (Excessive  Substance Use)  Outcome: Progressing  Flowsheets (Taken 5/1/2024 0743)  Mutually Determined Action Steps (Improved Behavioral Control): identifies major stressors  Intervention: Promote Behavior and Impulse Control  Flowsheets (Taken 5/1/2024 0743)  Behavior Management: behavioral plan reviewed  Goal: Increased Participation and Engagement (Excessive Substance Use)  Outcome: Progressing  Flowsheets (Taken 5/1/2024 0743)  Mutually Determined Action Steps (Increased Participation and Engagement): identifies support resources  Intervention: Facilitate Participation and Engagement  Flowsheets (Taken 5/1/2024 0743)  Supportive Measures: self-care encouraged  Diversional Activity: television  Goal: Improved Physiologic Symptoms (Excessive Substance Use)  Outcome: Progressing  Flowsheets (Taken 5/1/2024 0743)  Mutually Determined Action Steps (Improved Physiologic Symptoms): discusses use pattern  Intervention: Optimize Physiologic Function  Flowsheets (Taken 5/1/2024 0743)  Oral Nutrition Promotion: rest periods promoted  Nutrition Interventions: food preferences provided  Goal: Enhanced Social, Occupational or Functional Skills (Excessive Substance Use)  Outcome: Progressing  Flowsheets (Taken 5/1/2024 0743)  Mutually Determined Action Steps (Enhanced Social, Occupational or Functional Skills): identifies personal strengths  Intervention: Promote Social, Occupational and Functional Ability  Flowsheets (Taken 5/1/2024 0743)  Trust Relationship/Rapport: care explained  Social Functional Ability Promotion: autonomy promoted    He is AAO X 4. Flat affect and blunted mood. Denies SI, HI, and hallucinations. He did admit to relapse on alcohol. He stated he was drinking to ease the pain from a break up with his girlfriend. Good eye contact noted. Speech normal tone and speed. Isolated and withdrawn, but interacts with selected patients and staff. Continue plan of care and provide a safe and therapeutic environment. Continue to  monitor every fifteen minutes for safety.

## 2024-05-01 NOTE — NURSING
Treatment Team Note:      Behavior:    Patient (Gilson Chowdary is a 28 y.o. male, : 1995, MRN: [id number]) demonstrating an affect that was sad, flat, and anxious. Mandaen demonstrating mood that is depressed and anxious. Mandaen had an appearance that was clean. Mandaen denies suicidal ideation. Mandaen denies suicide plan. Mandaen denies hallucinations.      Intervention:    Encourage Mandaen to perform self-hygiene, grooming, and changing of clothing. Encourage Mandaen to attend all scheduled groups. Monitor Mandaen's behavior and program compliance. Monitor Mandaen for suicidal ideation, homicidal ideation, sleep disturbance, and hallucinations. Encourage Mandaen to eat all portions of meals and assess for meal preferences. Monitor Mandaen for intake and output to ensure hydration. Notify the Physician/Physician Assistant/Advance Practice Registered Nurse (MD/PA/APRN) for any medication refusal and any change in patient condition.    Discussed with Mandaen course of treatment. Discussed with Mandaen medications ordered and schedule of medications. Discussed collateral contact with Mandaen.      Response:    Mandaen's response to treatment team meeting: cooperative. States he had many stressors in his life recently. He lost his job, girlfriend broke up with him, and his dad was recently diagnosed with heart disease.       Plan:     Continue to monitor per MD/PA/APRN orders; maintain patient safety.

## 2024-05-01 NOTE — PROGRESS NOTES
05/01/24 1735   [facility]   Group Name Education   Specific Interventions Current Events   Participation Level Active   Participation Quality Cooperative   Insight/Motivation Good   Affect/Mood Display Appropriate   Cognition Oriented   Psychomotor WNL
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
## 2024-05-01 NOTE — PROGRESS NOTES
05/01/24 1500   [facility] Group Therapy   Group Name Therapeutic Recreation   Specific Interventions Leisure Counseling   Participation Level Supportive;Appropriate;Attentive;Sharing   Participation Quality Cooperative   Insight/Motivation Limited   Affect/Mood Display Appropriate;Blunted   Cognition Oriented;Alert   Psychomotor WNL
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
## 2024-05-01 NOTE — NURSING
Patient with complaints of nausea and vomit x 1. SL Zofran given per MD orders. Pt. Reassessed in 30 minutes, sleeping.

## 2024-05-01 NOTE — HPI
I drank too much alcohol.  My roommate was worried about me, because I had passed out and could not wake me up.  I got fired from my job and lost my relationship with girlfriend.  I was depressed. Denies SI or HI, AH or VH.  I drank about 1.5 pints of whiskey.

## 2024-05-01 NOTE — NURSING
Patient was given PRN trazodone for sleep as per his request. No other needs verbalized. Patient was in bed at time given.

## 2024-05-01 NOTE — PLAN OF CARE
"Gilson Chowdary is a 28 year old male admitted for a diagnosis of depression. Presented on 4/30/2024 to ED via EMS after reportedly drinking 400 mL of hard liquor over 40 minutes after a break-up with girlfriend and additional life stressors. Patient denies suicidal thoughts or suicide attempt stating that he was just "drinking to deal with it." Reports he was sober from alcohol for 6 months prior to this event. No psychiatric history, medications, or inpatient admissions. Medical history includes lupus but currently not medicated due to loss of provider. Lives with roommate, works full time at [employer]. Denies SI/HI/AVH/depression. Quiet, reserved, compliant upon completing admission. Speaks only to answer questions from staff. Tearful and endorses anxiety regarding admission.        Problem: Adult Behavioral Health Plan of Care  Goal: Plan of Care Review  Outcome: Progressing  Goal: Patient-Specific Goal (Individualization)  Outcome: Progressing  Goal: Adheres to Safety Considerations for Self and Others  Outcome: Progressing  Goal: Absence of New-Onset Illness or Injury  Outcome: Progressing  Goal: Optimized Coping Skills in Response to Life Stressors  Outcome: Progressing  Goal: Develops/Participates in Therapeutic [place] to Support Successful Transition  Outcome: Progressing  Goal: Rounds/Family Conference  Outcome: Progressing     Problem: Depressive Signs/Symptoms  Goal: Optimized Energy Level (Depressive Signs/Symptoms)  Outcome: Progressing  Goal: Optimized Cognitive Function (Depressive Signs/Symptoms)  Outcome: Progressing  Goal: Increased Participation and Engagement (Depressive Signs/Symptoms)  Outcome: Progressing  Goal: Enhanced Self-Esteem and Confidence (Depressive Signs/Symptoms)  Outcome: Progressing  Goal: Improved Mood Symptoms (Depressive Signs/Symptoms)  Outcome: Progressing  Goal: Optimized Nutrition Intake (Depressive Signs/Symptoms)  Outcome: Progressing  Goal: Improved Psychomotor " Symptoms (Depressive Signs/Symptoms)  Outcome: Progressing  Goal: Improved Sleep (Depressive Signs/Symptoms)  Outcome: Progressing  Goal: Enhanced Social, Occupational or Functional Skills (Depressive Signs/Symptoms)  Outcome: Progressing     Problem: Excessive Substance Use  Goal: Optimized Energy Level (Excessive Substance Use)  Outcome: Progressing  Goal: Improved Behavioral Control (Excessive Substance Use)  Outcome: Progressing  Goal: Increased Participation and Engagement (Excessive Substance Use)  Outcome: Progressing  Goal: Improved Physiologic Symptoms (Excessive Substance Use)  Outcome: Progressing  Goal: Enhanced Social, Occupational or Functional Skills (Excessive Substance Use)  Outcome: Progressing

## 2024-05-01 NOTE — CARE UPDATE
Sw spoke to pt mother Amena [phone].  Per report pt does not have a hx of etoh abuse as far as mother knows. Mother reports that pt lives in another city and she does not see pt regularly. Mother reported that pt has 3 siblings and now barely speaks to them. Mother reported that pt is a part of a [employer] and is trying to talk pt into working with the  and . Mother reported that pt suffers from insomnia and has since being dx with lupus. Mother believes that pt father is exaggerating his medical issues because his other children are unaware of what the pt shared with his mother.

## 2024-05-02 PROCEDURE — 11400000 HC PSYCH PRIVATE ROOM

## 2024-05-02 PROCEDURE — 25000003 PHARM REV CODE 250: Performed by: PSYCHIATRY & NEUROLOGY

## 2024-05-02 RX ORDER — IBUPROFEN 400 MG/1
400 TABLET ORAL 3 TIMES DAILY
Status: DISCONTINUED | OUTPATIENT
Start: 2024-05-02 | End: 2024-05-02

## 2024-05-02 RX ADMIN — HYDROXYZINE HYDROCHLORIDE 50 MG: 50 TABLET, FILM COATED ORAL at 08:05

## 2024-05-02 RX ADMIN — TRAZODONE HYDROCHLORIDE 100 MG: 100 TABLET ORAL at 08:05

## 2024-05-02 RX ADMIN — HYDROXYZINE HYDROCHLORIDE 50 MG: 50 TABLET, FILM COATED ORAL at 12:05

## 2024-05-02 RX ADMIN — ONDANSETRON 4 MG: 4 TABLET, ORALLY DISINTEGRATING ORAL at 08:05

## 2024-05-02 RX ADMIN — ACETAMINOPHEN 650 MG: 325 TABLET, FILM COATED ORAL at 02:05
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
## 2024-05-02 NOTE — NURSING
Patient stated relief from mid chest area  and upper abdominal area pain.After Acetaminophen 650 mg tablet given at 1458

## 2024-05-02 NOTE — PLAN OF CARE
05/02/24 1400   [facility] Group Therapy   Group Name Medication   Specific Interventions Other (see comments)  (Medication Adherence)   Participation Level Attentive   Participation Quality Cooperative   Insight/Motivation Good   Affect/Mood Display Appropriate   Cognition Alert;Oriented   Psychomotor WNL
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
## 2024-05-02 NOTE — H&P
CONSULT NOTE    Admit Date: 4/30/2024   LOS: 2 days     SUBJECTIVE:     Follow-up For:  abrasion to chest wall     Scheduled Meds:  Current Facility-Administered Medications   Medication Dose Route Frequency    nicotine  1 patch Transdermal Daily     Continuous Infusions:  Current Facility-Administered Medications   Medication Dose Route Frequency Last Rate Last Admin     PRN Meds:  Current Facility-Administered Medications:     acetaminophen, 650 mg, Oral, Q6H PRN    aluminum-magnesium hydroxide-simethicone, 30 mL, Oral, Q6H PRN    hydrOXYzine HCL, 50 mg, Oral, Q4H PRN    ondansetron, 4 mg, Oral, Q6H PRN    traZODone, 100 mg, Oral, Nightly PRN    Review of patient's allergies indicates:  No Known Allergies    Review of Systems  ROS abrasion to chest wall     OBJECTIVE:     Vital Signs (Most Recent)  Temp: 99.7 °F (37.6 °C) (05/01/24 1530)  Pulse: 90 (05/01/24 1530)  Resp: 18 (05/01/24 1530)  BP: 135/89 (05/01/24 1530)  SpO2: 99 % (04/30/24 2309)    Vital Signs Range (Last 24H):  Temp:  [99.7 °F (37.6 °C)]   Pulse:  [90]   Resp:  [18]   BP: (135)/(89)     I & O (Last 24H):No intake or output data in the 24 hours ending 05/02/24 1217  Physical Exam:  Physical Exam not infected     Laboratory:  No results found for this or any previous visit (from the past 24 hour(s)).         ASSESSMENT/PLAN:     Chest wall abrasion

## 2024-05-02 NOTE — PLAN OF CARE
Problem: Adult Behavioral Health Plan of Care  Goal: Plan of Care Review  Outcome: Progressing  Flowsheets (Taken 5/2/2024 0752)  Patient Agreement with Plan of Care: agrees  Plan of Care Reviewed With: patient  Goal: Patient-Specific Goal (Individualization)  Outcome: Progressing  Flowsheets (Taken 5/2/2024 0752)  Patient Personal Strengths: ability to maintain sobriety  Patient Vulnerabilities: recent loss  Goal: Adheres to Safety Considerations for Self and Others  Outcome: Progressing  Flowsheets (Taken 5/2/2024 0752)  Adheres to Safety Considerations for Self and Others: making progress toward outcome  Intervention: Develop and Maintain Individualized Safety Plan  Flowsheets (Taken 5/2/2024 0752)  Safety Measures: safety rounds completed  Goal: Absence of New-Onset Illness or Injury  Outcome: Progressing  Intervention: Identify and Manage Fall Risk  Flowsheets (Taken 5/2/2024 0752)  Safety Measures: safety rounds completed  Intervention: Prevent VTE (Venous Thromboembolism)  Flowsheets (Taken 5/2/2024 0752)  VTE Prevention/Management: fluids promoted  Intervention: Prevent Infection  Flowsheets (Taken 5/2/2024 0752)  Infection Prevention: hand hygiene promoted  Goal: Optimized Coping Skills in Response to Life Stressors  Outcome: Progressing  Flowsheets (Taken 5/2/2024 0752)  Optimized Coping Skills in Response to Life Stressors: making progress toward outcome  Intervention: Promote Effective Coping Strategies  Flowsheets (Taken 5/2/2024 0752)  Supportive Measures: self-care encouraged  Goal: Develops/Participates in Therapeutic [place] to Support Successful Transition  Outcome: Progressing  Flowsheets (Taken 5/2/2024 0752)  Develops/Participates in Therapeutic [place] to Support Successful Transition: making progress toward outcome  Intervention: Foster Therapeutic [place]  Flowsheets (Taken 5/2/2024 0752)  Trust Relationship/Rapport: care explained  Goal: Rounds/Family Conference  Outcome:  Progressing  Flowsheets (Taken 5/2/2024 0752)  Participants:   nursing   milieu/psych techs     Problem: Depressive Signs/Symptoms  Goal: Optimized Energy Level (Depressive Signs/Symptoms)  Outcome: Progressing  Flowsheets (Taken 5/2/2024 0752)  Mutually Determined Action Steps (Optimized Energy Level): grooms self without prompting  Intervention: Optimize Energy Level  Flowsheets (Taken 5/2/2024 0752)  Activity (Behavioral Health): up ad edgard  Patient Performed Hygiene: teeth brushed  Diversional Activity: television  Goal: Optimized Cognitive Function (Depressive Signs/Symptoms)  Outcome: Progressing  Flowsheets (Taken 5/2/2024 0752)  Mutually Determined Action Steps (Optimized Cognitive Function): remains focused during activity  Goal: Increased Participation and Engagement (Depressive Signs/Symptoms)  Outcome: Progressing  Flowsheets (Taken 5/2/2024 0752)  Mutually Determined Action Steps (Increased Participation and Engagement): participates in one or more activity  Intervention: Facilitate Participation and Engagement  Flowsheets (Taken 5/2/2024 0752)  Supportive Measures: self-care encouraged  Diversional Activity: television  Goal: Enhanced Self-Esteem and Confidence (Depressive Signs/Symptoms)  Outcome: Progressing  Flowsheets (Taken 5/2/2024 0752)  Mutually Determined Action Steps (Enhanced Self-Esteem and Confidence): identifies judgmental thoughts  Intervention: Promote Confidence and Self-Esteem  Flowsheets (Taken 5/2/2024 0752)  Supportive Measures: self-care encouraged  Goal: Improved Mood Symptoms (Depressive Signs/Symptoms)  Outcome: Progressing  Flowsheets (Taken 5/2/2024 0752)  Mutually Determined Action Steps (Improved Mood Symptoms): acknowledges progress  Intervention: Promote Mood Improvement  Flowsheets (Taken 5/2/2024 0752)  Supportive Measures: self-care encouraged  Diversional Activity: television  Goal: Optimized Nutrition Intake (Depressive Signs/Symptoms)  Outcome:  Progressing  Flowsheets (Taken 5/2/2024 0752)  Mutually Determined Action Steps (Optimized Nutrition Intake): eats at meal time  Intervention: Promote Optimal Nutrition Intake  Flowsheets (Taken 5/2/2024 0752)  Nutrition Interventions: food preferences provided  Bowel Elimination Promotion: adequate fluid intake promoted  Goal: Improved Psychomotor Symptoms (Depressive Signs/Symptoms)  Outcome: Progressing  Flowsheets (Taken 5/2/2024 0752)  Mutually Determined Action Steps (Improved Psychomotor Symptoms): adheres to medication regimen  Intervention: Manage Psychomotor Movement  Flowsheets (Taken 5/2/2024 0752)  Activity (Behavioral Health): up ad edgard  Patient Performed Hygiene: teeth brushed  Diversional Activity: television  Goal: Improved Sleep (Depressive Signs/Symptoms)  Outcome: Progressing  Flowsheets (Taken 5/2/2024 0752)  Mutually Determined Action Steps (Improved Sleep): sleeps 4-6 hours at night  Intervention: Promote Healthy Sleep Hygiene  Flowsheets (Taken 5/2/2024 0752)  Sleep Hygiene Promotion: regular sleep pattern promoted  Goal: Enhanced Social, Occupational or Functional Skills (Depressive Signs/Symptoms)  Outcome: Progressing  Flowsheets (Taken 5/2/2024 0752)  Mutually Determined Action Steps (Enhanced Social, Occupational or Functional Skills): identifies personal strengths  Intervention: Promote Social, Occupational and Functional Ability  Flowsheets (Taken 5/2/2024 0752)  Social Functional Ability Promotion: autonomy promoted     Problem: Excessive Substance Use  Goal: Optimized Energy Level (Excessive Substance Use)  Outcome: Progressing  Flowsheets (Taken 5/2/2024 0752)  Mutually Determined Action Steps (Optimized Energy Level): grooms self without prompting  Intervention: Optimize Energy Level  Flowsheets (Taken 5/2/2024 0752)  Activity (Behavioral Health): up ad edgard  Patient Performed Hygiene: teeth brushed  Diversional Activity: television  Goal: Improved Behavioral Control (Excessive  Substance Use)  Outcome: Progressing  Flowsheets (Taken 5/2/2024 0752)  Mutually Determined Action Steps (Improved Behavioral Control): identifies major stressors  Intervention: Promote Behavior and Impulse Control  Flowsheets (Taken 5/2/2024 0752)  Behavior Management: behavioral plan reviewed  Goal: Increased Participation and Engagement (Excessive Substance Use)  Outcome: Progressing  Flowsheets (Taken 5/2/2024 0752)  Mutually Determined Action Steps (Increased Participation and Engagement): identifies support resources  Intervention: Facilitate Participation and Engagement  Flowsheets (Taken 5/2/2024 0752)  Supportive Measures: self-care encouraged  Diversional Activity: television  Goal: Improved Physiologic Symptoms (Excessive Substance Use)  Outcome: Progressing  Flowsheets (Taken 5/2/2024 0752)  Mutually Determined Action Steps (Improved Physiologic Symptoms): discusses use pattern  Intervention: Optimize Physiologic Function  Flowsheets (Taken 5/2/2024 0752)  Oral Nutrition Promotion: rest periods promoted  Nutrition Interventions: food preferences provided  Goal: Enhanced Social, Occupational or Functional Skills (Excessive Substance Use)  Outcome: Progressing  Flowsheets (Taken 5/2/2024 0752)  Mutually Determined Action Steps (Enhanced Social, Occupational or Functional Skills): identifies personal strengths  Intervention: Promote Social, Occupational and Functional Ability  Flowsheets (Taken 5/2/2024 0752)  Trust Relationship/Rapport: care explained  Social Functional Ability Promotion: autonomy promoted    He is AAO X 4. Flat affect and blunted mood. States he is anxious this AM. Depressed mood voiced due to the break up with girlfriend, loss of job, and his father is ill. Denies SI, HI, and hallucinations. Interacts with selected patients and staff. Isolated at times. Good eye contact noted. Speech normal tone and speed. Continue plan of care and provide a safe and therapeutic environment. Continue to  monitor every fifteen minutes for safety.

## 2024-05-02 NOTE — PLAN OF CARE
Problem: Adult Behavioral Health Plan of Care  Goal: Plan of Care Review  Outcome: Progressing  Flowsheets (Taken 5/1/2024 2153)  Patient Agreement with Plan of Care: agrees  Plan of Care Reviewed With: patient  Goal: Patient-Specific Goal (Individualization)  Outcome: Progressing  Goal: Adheres to Safety Considerations for Self and Others  Outcome: Progressing  Goal: Absence of New-Onset Illness or Injury  Outcome: Progressing  Goal: Optimized Coping Skills in Response to Life Stressors  Outcome: Progressing  Goal: Develops/Participates in Therapeutic [place] to Support Successful Transition  Outcome: Progressing     Problem: Depressive Signs/Symptoms  Goal: Optimized Energy Level (Depressive Signs/Symptoms)  Outcome: Progressing  Goal: Optimized Cognitive Function (Depressive Signs/Symptoms)  Outcome: Progressing    Goal: Increased Participation and Engagement (Depressive Signs/Symptoms)  Outcome: Progressing  Goal: Enhanced Self-Esteem and Confidence (Depressive Signs/Symptoms)  Outcome: Progressing  Goal: Improved Mood Symptoms (Depressive Signs/Symptoms)  Outcome: Progressing  Goal: Optimized Nutrition Intake (Depressive Signs/Symptoms)  Outcome: Progressing  Goal: Improved Psychomotor Symptoms (Depressive Signs/Symptoms)  Outcome: Progressing  Goal: Improved Sleep (Depressive Signs/Symptoms)  Outcome: Progressing  Goal: Enhanced Social, Occupational or Functional Skills (Depressive Signs/Symptoms)  Outcome: Progressing     Problem: Excessive Substance Use  Goal: Optimized Energy Level (Excessive Substance Use)  Outcome: Progressing  Flowsheets (Taken 5/1/2024 2153)  Mutually Determined Action Steps (Optimized Energy Level): grooms self without prompting    Pt in hallway, calm and cooperative, talking with peer. Flat affect, endorses depression and anxiety. Denies SI/HI and A/V/H. Trazodone given  @ 2000 for insomnia, see mar. Q15 min obs active. Plan of care ongoing safety maintained.    2030 Pt in bed at  this time resting with eyes closed.

## 2024-05-02 NOTE — PROGRESS NOTES
05/02/24 1000   [facility] Group Therapy   Group Name Therapeutic Recreation   Specific Interventions Skilled Activity Leisure Education and Awareness   Participation Level Active;Supportive;Appropriate;Attentive;Sharing   Participation Quality Cooperative   Insight/Motivation Improved   Affect/Mood Display Appropriate;Bright   Cognition Oriented;Alert   Psychomotor WNL

## 2024-05-02 NOTE — PROGRESS NOTES
05/02/24 1500   [facility] Group Therapy   Group Name Therapeutic Recreation   Specific Interventions Skilled Activity Creative Expression   Participation Level Active;Supportive;Appropriate;Sharing;Attentive   Participation Quality Cooperative;Social   Insight/Motivation Improved;Applies New Skills   Affect/Mood Display Appropriate;Bright   Cognition Oriented;Alert   Psychomotor WNL

## 2024-05-02 NOTE — PLAN OF CARE
Олег met reported treatment goal of A change in myself.  CTRS Discharge Recommendations:  Encouraged Pt. to actively utilize available community resources to increase leisure involvement to decrease signs and symptoms of illness.  Encouraged Pt. to utilize coping skills on a regular basis to reduce the risk of decomposition and re-hospitalization.

## 2024-05-03 VITALS
WEIGHT: 166 LBS | RESPIRATION RATE: 16 BRPM | SYSTOLIC BLOOD PRESSURE: 126 MMHG | TEMPERATURE: 98 F | HEART RATE: 66 BPM | BODY MASS INDEX: 26.68 KG/M2 | OXYGEN SATURATION: 99 % | HEIGHT: 66 IN | DIASTOLIC BLOOD PRESSURE: 82 MMHG

## 2024-05-03 PROCEDURE — 25000003 PHARM REV CODE 250: Performed by: PSYCHIATRY & NEUROLOGY

## 2024-05-03 RX ADMIN — HYDROXYZINE HYDROCHLORIDE 50 MG: 50 TABLET, FILM COATED ORAL at 08:05

## 2024-05-03 NOTE — PLAN OF CARE
Nursing assessment completed as charted. Patient denies depression,AVH, SI and HI. Patient endorses sleep disturbance and mild anxiety. Patient was given PRN atarax and PRN trazodode. Patient also endorsed nausea and was given PRN zofran. Will continue to monitor   Problem: Depressive Signs/Symptoms  Goal: Optimized Energy Level (Depressive Signs/Symptoms)  Outcome: Progressing  Flowsheets (Taken 5/2/2024 2217)  Mutually Determined Action Steps (Optimized Energy Level): grooms self without prompting  Intervention: Optimize Energy Level  Flowsheets (Taken 5/2/2024 2217)  Activity (Behavioral Health): up ad edgard  Diversional Activity: television  Goal: Enhanced Self-Esteem and Confidence (Depressive Signs/Symptoms)  Outcome: Progressing  Flowsheets (Taken 5/2/2024 2217)  Mutually Determined Action Steps (Enhanced Self-Esteem and Confidence): verbalizes successes  Intervention: Promote Confidence and Self-Esteem  Flowsheets (Taken 5/2/2024 2217)  Supportive Measures:   active listening utilized   decision-making supported   self-care encouraged   verbalization of feelings encouraged

## 2024-05-03 NOTE — NURSING
"PRN Medication Follow-up Note:    Behavior:    Patient (Gilson Chowdary is a 28 y.o. male, : 1995, MRN: [id number])     Allergies: Patient has no known allergies.    Sikhism's  height is 5' 6" (1.676 m) and weight is 75.3 kg (166 lb). His temperature is 98.4 °F (36.9 °C). His blood pressure is 126/82 and his pulse is 66. His respiration is 16 and oxygen saturation is 99%.     Administered Atarax 50 mg PO PRN per physician order to Sikhism       Intervention:    Intervention to Sikhism's response: relief of anxiety.      Response:    Sikhism's response: relief of anxiety.      Plan:     Continue to monitor per MD/PA/APRN orders; and reevaluate medication effectiveness within 30 minutes.    "

## 2024-05-03 NOTE — PLAN OF CARE
Problem: Adult Behavioral Health Plan of Care  Goal: Plan of Care Review  Outcome: Met  Goal: Patient-Specific Goal (Individualization)  Outcome: Met  Goal: Adheres to Safety Considerations for Self and Others  Outcome: Met  Goal: Absence of New-Onset Illness or Injury  Outcome: Met  Goal: Optimized Coping Skills in Response to Life Stressors  Outcome: Met  Goal: Develops/Participates in Therapeutic [place] to Support Successful Transition  Outcome: Met  Goal: Rounds/Family Conference  Outcome: Met     Problem: Depressive Signs/Symptoms  Goal: Optimized Energy Level (Depressive Signs/Symptoms)  Outcome: Met  Goal: Optimized Cognitive Function (Depressive Signs/Symptoms)  Outcome: Met  Goal: Increased Participation and Engagement (Depressive Signs/Symptoms)  Outcome: Met  Goal: Enhanced Self-Esteem and Confidence (Depressive Signs/Symptoms)  Outcome: Met  Goal: Improved Mood Symptoms (Depressive Signs/Symptoms)  Outcome: Met  Goal: Optimized Nutrition Intake (Depressive Signs/Symptoms)  Outcome: Met  Goal: Improved Psychomotor Symptoms (Depressive Signs/Symptoms)  Outcome: Met  Goal: Improved Sleep (Depressive Signs/Symptoms)  Outcome: Met  Goal: Enhanced Social, Occupational or Functional Skills (Depressive Signs/Symptoms)  Outcome: Met     Problem: Excessive Substance Use  Goal: Optimized Energy Level (Excessive Substance Use)  Outcome: Met  Goal: Improved Behavioral Control (Excessive Substance Use)  Outcome: Met  Goal: Increased Participation and Engagement (Excessive Substance Use)  Outcome: Met  Goal: Improved Physiologic Symptoms (Excessive Substance Use)  Outcome: Met  Goal: Enhanced Social, Occupational or Functional Skills (Excessive Substance Use)  Outcome: Met

## 2024-05-03 NOTE — NURSING
"PRN Administration Note:    Behavior:    Patient (Gilson Chowdary is a 28 y.o. male, : 1995, MRN: [id number])     Allergies: Patient has no known allergies.    Gilson's  height is 5' 6" (1.676 m) and weight is 75.3 kg (166 lb). His temperature is 98.4 °F (36.9 °C). His blood pressure is 126/82 and his pulse is 66. His respiration is 16 and oxygen saturation is 99%.     Reason for PRN Administration: anxiety.  Intervention:    Administered Atarax 50 mg PO PRN per physician order to Gilson       Response:    Gilson tolerated administration well.      Plan:     Continue to monitor per MD/PA/APRN orders; and reevaluate medication effectiveness within 30 minutes.    "

## 2024-05-03 NOTE — PROGRESS NOTES
5/3/2024  Gilson Chowdary   1995   [id number]        Psychiatry Progress Note     Chief Complaint: Feel much better    SUBJECTIVE:   Gilson Chowdary is a 28 y.o. male male placed under a PEC at [facility] after patient's roommate reported that patient's girlfriend recently broke up with him and he was drinking significant amounts of alcohol.     Today patient states that he is feeling great. He states that he continued to speak with the counselors here and has spoken with his mother who will be helping him get set up with counseling through his families [place]. He denies any thoughts of harming himself. Calm and cooperative during evaluation. Will proceed with discharge today.        Current Medications:   Scheduled Meds:   Current Facility-Administered Medications   Medication Dose Route Frequency    nicotine  1 patch Transdermal Daily      PRN Meds:   Current Facility-Administered Medications:     acetaminophen, 650 mg, Oral, Q6H PRN    aluminum-magnesium hydroxide-simethicone, 30 mL, Oral, Q6H PRN    hydrOXYzine HCL, 50 mg, Oral, Q4H PRN    ondansetron, 4 mg, Oral, Q6H PRN    traZODone, 100 mg, Oral, Nightly PRN   Psychotherapeutics (From admission, onward)      Start     Stop Route Frequency Ordered    04/30/24 2331  traZODone tablet 100 mg         -- Oral Nightly PRN 04/30/24 2331            Allergies:   Review of patient's allergies indicates:  No Known Allergies     OBJECTIVE:   Vitals   Vitals:    05/02/24 1930   BP: 127/74   Pulse: 69   Resp:    Temp: 98.2 °F (36.8 °C)        Labs/Imaging/Studies:   No results found for this or any previous visit (from the past 36 hour(s)).       Medical Review Of Systems:  A comprehensive review of systems was negative.      Psychiatric Mental Status Exam:  General Appearance: appears stated age, well-developed, well-nourished  Arousal: alert  Behavior: cooperative  Movements and Motor Activity: no abnormal involuntary movements noted  Orientation: oriented to  "person, place, time, and situation  Speech: normal rate, normal rhythm, normal volume, normal tone  Mood: "Great"  Affect: constricted  Thought Process: linear  Associations: intact  Thought Content and Perceptions: denies acute suicidal ideation, no homicidal ideation, no auditory hallucinations, no visual hallucinations, no paranoid ideation, no ideas of reference  Recent and Remote Memory: recent memory intact, remote memory intact; per interview/observation with patient  Attention and Concentration: intact, attentive to conversation; per interview/observation with patient  Fund of Knowledge: intact, aware of current events, vocabulary appropriate; based on history, vocabulary, fund of knowledge, syntax, grammar, and content  Insight: questionable; based on understanding of severity of illness and HPI  Judgment: questionable; based on patient's behavior and HP    ASSESSMENT/PLAN:   Problems Addressed/Diagnoses:  Mood Disorder (F39)  Anxiety (F41.9)  Alcohol use disorder (F10.10)  Cannabis use disorder (F12.10)     Past Medical History:   Diagnosis Date    Systemic lupus erythematosus, organ or system involvement unspecified         Plan:  Mood disorder, acute  -Monitor for now     Anxiety, acute  -PRN hydroxyzine     Alcohol use, acute  -Group/Individual psychotherapy     Cannabis use, acute  -Group/Individual psychotherapy     Expected Disposition Plan: Home        Rod Mercado PMNANDINIP-BC  "

## 2024-05-03 NOTE — NURSING
Discharge Note:    Gilson Chowdary is a 28 y.o. male, : 1995, MRN: [id number], admitted on 2024 for Pradip Florentino MD with a diagnosis of Depression [F32.A].    Patient discharged on 5/3/2024 per physician orders in stable condition. Patient denied suicidal ideation, homicidal ideation, or hallucinations. Patient was discharged with valuables, personal belongings, prescriptions, discharge instructions, and an educational handout explaining the diagnosis and prescribed medications. Patient verbalized understanding of the discharge instructions and importance of follow-up visits. Patient was escorted out of the facility by [facility] and placed into a private vehicle to be transported to home.     Patient discharged on the following medications:     Medication List        STOP taking these medications      famotidine 20 MG tablet  Commonly known as: PEPCID     ibuprofen 800 MG tablet  Commonly known as: ADVIL,MOTRIN     indomethacin 50 MG capsule  Commonly known as: INDOCIN     LIDOcaine 5 %  Commonly known as: LIDODERM     ondansetron 4 MG Tbdl  Commonly known as: ZOFRAN-ODT     pantoprazole 40 MG tablet  Commonly known as: PROTONIX

## 2024-05-14 NOTE — DISCHARGE SUMMARY
"DISCHARGE SUMMARY  PSYCHIATRY      Admit Date: 4/30/2024 11:30 PM    Discharge Date:  5/3/2024    SITE:   [facility]    Discharge Attending Physician: Pradip Florentino M.D.    Chief Complaint:  "I was trying to drink to forget some problems     History of Present Illness On Admit:   Gilson Chowdary is a 28 y.o. male placed under a PEC at [facility] after patient's roommate reported that patient's girlfriend recently broke up with him and he was drinking significant amounts of alcohol.     Patient states that his girlfriend broke up with him 2 days ago, lost his job 4 days ago, and found out that his father will need a heart transplant yesterday.  He states that he began drinking yesterday after all of this news but had not had any alcohol in a year.     He did have an interview at [facility] yesterday and states that he wanted to have a drink to celebrate because he felt like it went well.  Calm and cooperative during evaluation.     No prior psychiatric history.  Does feel like he may require PRN medication for anxiety while here but does not feel like he needs to be on scheduled daily medication.     Staff will reach out to mother and will admit for observation.     UDS: (+)cannabis  Alcohol: 241 --> 174      Admit Mental Status Exam:  General Appearance: appears stated age, well-developed, well-nourished  Arousal: alert  Behavior: cooperative  Movements and Motor Activity: no abnormal involuntary movements noted  Orientation: oriented to person, place, time, and situation  Speech: normal rate, normal rhythm, normal volume, normal tone  Mood: "All right"  Affect: constricted  Thought Process: linear  Associations: intact  Thought Content and Perceptions: denies acute suicidal ideation, no homicidal ideation, no auditory hallucinations, no visual hallucinations, no paranoid ideation, no ideas of reference  Recent and Remote Memory: recent memory intact, remote " memory intact; per interview/observation with patient  Attention and Concentration: intact, attentive to conversation; per interview/observation with patient  Fund of Knowledge: intact, aware of current events, vocabulary appropriate; based on history, vocabulary, fund of knowledge, syntax, grammar, and content  Insight: questionable; based on understanding of severity of illness and HPI  Judgment: questionable; based on patient's behavior and HPI       Diagnoses:  PRINCIPAL PROBLEM:  Mild mood disorder      PROBLEM LIST    Mild mood disorder    Anxiety disorder    Alcohol abuse    Cannabis abuse    Marijuana use        Hospital Course:   Patient was admitted to [facility] and patient deferred scheduled medications but was open to PRN medication.  PRN hydroxyzine started.    5/2/24  Today patient states that he is feeling much better. He states that he has been able to speak with the counselors here and discuss things that he has been suppressing for a long time. He denies any thoughts of harming himself. He states that he was anxious when he first arrived but this has gotten better since he was able to speak with the counselor. Calm and cooperative during evaluation. Will continue to monitor and plan for discharge in the next day or so.        5/3/24  Today patient states that he is feeling great. He states that he continued to speak with the counselors here and has spoken with his mother who will be helping him get set up with counseling through his families [place]. He denies any thoughts of harming himself. Calm and cooperative during evaluation. Will proceed with discharge today.       Current Medications:   Scheduled Meds:        DISCHARGE EXAMINATION    VITALS   Vitals:    05/02/24 1100 05/02/24 1600 05/02/24 1930 05/03/24 0701   BP: (!) 133/94 (!) 133/90 127/74 126/82   BP Location:   Right arm    Patient Position:   Sitting    Pulse: 85 76 69 66   Resp: 18 18  16   Temp: 98.4 °F (36.9 °C) 98.1 °F (36.7 °C) 98.2 °F  "(36.8 °C) 98.4 °F (36.9 °C)   TempSrc:   Oral    SpO2:  100% 96% 99%   Weight:       Height:             Discharge Mental Status Exam:  General Appearance: appears stated age, well-developed, well-nourished  Arousal: alert  Behavior: cooperative  Movements and Motor Activity: no abnormal involuntary movements noted  Orientation: oriented to person, place, time, and situation  Speech: normal rate, normal rhythm, normal volume, normal tone  Mood: "Great"  Affect: constricted  Thought Process: linear  Associations: intact  Thought Content and Perceptions: denies acute suicidal ideation, no homicidal ideation, no auditory hallucinations, no visual hallucinations, no paranoid ideation, no ideas of reference  Recent and Remote Memory: recent memory intact, remote memory intact; per interview/observation with patient  Attention and Concentration: intact, attentive to conversation; per interview/observation with patient  Fund of Knowledge: intact, aware of current events, vocabulary appropriate; based on history, vocabulary, fund of knowledge, syntax, grammar, and content  Insight: questionable; based on understanding of severity of illness and HPI  Judgment: questionable; based on patient's behavior and HPI      Discharge Condition:  Stable    Prognosis:  Fair    Justification for multiple antipsychotics:  N/a    Disposition:  discharged to home    Follow-up:     Follow-up Information       Center, [facility]. Go to.    Specialties: Behavioral Health, Psychiatry, Psychology  Why: Pt will utilize walk-in services Mon-Thursday 8-2 and Friday 8-10. Please bring your id, Medicaid card, and discharge papers with you for your appointment  Contact information:  Ashlee GODFREY [id number] [phone]                             Medication Regimen:  No current facility-administered medications for this encounter.  No current outpatient medications on file.      Patient Instructions:   Continue medication regimen " as prescribed.    Disposition plan per  - see  notes for details.    Patient instructed to call 911 or present to emergency department if any of the following complications develop status post discharge: suicidality, homicidality, or grave disability.     Total time spent discharging patient: <30 minutes      Pradip Florentino M.D.

## 2024-06-16 ENCOUNTER — HOSPITAL ENCOUNTER (EMERGENCY)
Facility: HOSPITAL | Age: 29
Discharge: HOME | End: 2024-06-16
Attending: EMERGENCY MEDICINE
Payer: MEDICAID

## 2024-06-16 VITALS
OXYGEN SATURATION: 100 % | SYSTOLIC BLOOD PRESSURE: 132 MMHG | BODY MASS INDEX: 25.18 KG/M2 | RESPIRATION RATE: 18 BRPM | HEART RATE: 49 BPM | DIASTOLIC BLOOD PRESSURE: 80 MMHG | WEIGHT: 156 LBS | TEMPERATURE: 98 F

## 2024-06-16 DIAGNOSIS — R04.0 EPISTAXIS: ICD-10-CM

## 2024-06-16 DIAGNOSIS — R07.9 CHEST PAIN: Primary | ICD-10-CM

## 2024-06-16 LAB
ABS NEUT (OLG): 1.4 X10(3)/MCL (ref 2.1–9.2)
ALBUMIN SERPL-MCNC: 3.9 G/DL (ref 3.5–5)
ALBUMIN/GLOB SERPL: 1.4 RATIO (ref 1.1–2)
ALP SERPL-CCNC: 58 UNIT/L (ref 40–150)
ALT SERPL-CCNC: 17 UNIT/L (ref 0–55)
ANION GAP SERPL CALC-SCNC: 6 MEQ/L
AST SERPL-CCNC: 17 UNIT/L (ref 5–34)
BASOPHILS NFR BLD MANUAL: 0.1 X10(3)/MCL (ref 0–0.2)
BASOPHILS NFR BLD MANUAL: 4 %
BILIRUB SERPL-MCNC: 1.2 MG/DL
BUN SERPL-MCNC: 7.2 MG/DL (ref 8.9–20.6)
CALCIUM SERPL-MCNC: 9.3 MG/DL (ref 8.4–10.2)
CHLORIDE SERPL-SCNC: 108 MMOL/L (ref 98–107)
CO2 SERPL-SCNC: 26 MMOL/L (ref 22–29)
CREAT SERPL-MCNC: 0.89 MG/DL (ref 0.73–1.18)
CREAT/UREA NIT SERPL: 8
EOSINOPHIL NFR BLD MANUAL: 0.03 X10(3)/MCL (ref 0–0.9)
EOSINOPHIL NFR BLD MANUAL: 1 %
ERYTHROCYTE [DISTWIDTH] IN BLOOD BY AUTOMATED COUNT: 11.9 % (ref 11.5–17)
GFR SERPLBLD CREATININE-BSD FMLA CKD-EPI: >60 ML/MIN/1.73/M2
GLOBULIN SER-MCNC: 2.8 GM/DL (ref 2.4–3.5)
GLUCOSE SERPL-MCNC: 91 MG/DL (ref 74–100)
HCT VFR BLD AUTO: 42.1 % (ref 42–52)
HGB BLD-MCNC: 14.4 G/DL (ref 14–18)
INSTRUMENT WBC (OLG): 2.55 X10(3)/MCL
LYMPHOCYTES NFR BLD MANUAL: 0.84 X10(3)/MCL
LYMPHOCYTES NFR BLD MANUAL: 33 %
MCH RBC QN AUTO: 31.3 PG (ref 27–31)
MCHC RBC AUTO-ENTMCNC: 34.2 G/DL (ref 33–36)
MCV RBC AUTO: 91.5 FL (ref 80–94)
MONOCYTES NFR BLD MANUAL: 0.2 X10(3)/MCL (ref 0.1–1.3)
MONOCYTES NFR BLD MANUAL: 8 %
NEUTROPHILS NFR BLD MANUAL: 55 %
NRBC BLD AUTO-RTO: 0 %
OHS QRS DURATION: 90 MS
OHS QTC CALCULATION: 371 MS
PLATELET # BLD AUTO: 145 X10(3)/MCL (ref 130–400)
PLATELET # BLD EST: NORMAL 10*3/UL
PLATELETS.RETICULATED NFR BLD AUTO: 9.1 % (ref 0.9–11.2)
PMV BLD AUTO: 11.6 FL (ref 7.4–10.4)
POTASSIUM SERPL-SCNC: 4 MMOL/L (ref 3.5–5.1)
PROT SERPL-MCNC: 6.7 GM/DL (ref 6.4–8.3)
RBC # BLD AUTO: 4.6 X10(6)/MCL (ref 4.7–6.1)
RBC MORPH BLD: NORMAL
SODIUM SERPL-SCNC: 140 MMOL/L (ref 136–145)
TROPONIN I SERPL-MCNC: <0.01 NG/ML (ref 0–0.04)
WBC # BLD AUTO: 2.55 X10(3)/MCL (ref 4.5–11.5)

## 2024-06-16 PROCEDURE — 84484 ASSAY OF TROPONIN QUANT: CPT | Performed by: NURSE PRACTITIONER

## 2024-06-16 PROCEDURE — 80053 COMPREHEN METABOLIC PANEL: CPT | Performed by: NURSE PRACTITIONER

## 2024-06-16 PROCEDURE — 85027 COMPLETE CBC AUTOMATED: CPT | Performed by: NURSE PRACTITIONER

## 2024-06-16 PROCEDURE — 93010 ELECTROCARDIOGRAM REPORT: CPT | Mod: ,,, | Performed by: INTERNAL MEDICINE

## 2024-06-16 PROCEDURE — 96372 THER/PROPH/DIAG INJ SC/IM: CPT | Performed by: NURSE PRACTITIONER

## 2024-06-16 PROCEDURE — 99285 EMERGENCY DEPT VISIT HI MDM: CPT | Mod: 25

## 2024-06-16 PROCEDURE — 63600175 PHARM REV CODE 636 W HCPCS: Performed by: NURSE PRACTITIONER

## 2024-06-16 PROCEDURE — 93005 ELECTROCARDIOGRAM TRACING: CPT

## 2024-06-16 RX ORDER — KETOROLAC TROMETHAMINE 30 MG/ML
60 INJECTION, SOLUTION INTRAMUSCULAR; INTRAVENOUS
Status: COMPLETED | OUTPATIENT
Start: 2024-06-16 | End: 2024-06-16

## 2024-06-16 RX ORDER — IBUPROFEN 600 MG/1
600 TABLET ORAL EVERY 8 HOURS PRN
Qty: 15 TABLET | Refills: 0 | Status: SHIPPED | OUTPATIENT
Start: 2024-06-16

## 2024-06-16 RX ADMIN — KETOROLAC TROMETHAMINE 60 MG: 30 INJECTION, SOLUTION INTRAMUSCULAR at 02:06
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
## 2025-04-24 NOTE — DISCHARGE INSTRUCTIONS
